# Patient Record
Sex: MALE | Race: OTHER | HISPANIC OR LATINO | Employment: FULL TIME | URBAN - METROPOLITAN AREA
[De-identification: names, ages, dates, MRNs, and addresses within clinical notes are randomized per-mention and may not be internally consistent; named-entity substitution may affect disease eponyms.]

---

## 2017-05-17 ENCOUNTER — ALLSCRIPTS OFFICE VISIT (OUTPATIENT)
Dept: OTHER | Facility: OTHER | Age: 22
End: 2017-05-17

## 2018-01-13 NOTE — MISCELLANEOUS
Provider Comments  Provider Comments:   Patient no showed his new patient 11AM appointment today 5/17/2017      Signatures   Electronically signed by : CHRIS Ocampo; May 17 2017 12:24PM EST                       (Author)

## 2018-10-09 ENCOUNTER — HOSPITAL ENCOUNTER (EMERGENCY)
Facility: HOSPITAL | Age: 23
Discharge: HOME | End: 2018-10-09
Attending: EMERGENCY MEDICINE | Admitting: EMERGENCY MEDICINE

## 2018-10-09 VITALS
HEART RATE: 68 BPM | SYSTOLIC BLOOD PRESSURE: 135 MMHG | DIASTOLIC BLOOD PRESSURE: 63 MMHG | OXYGEN SATURATION: 98 % | TEMPERATURE: 98 F | WEIGHT: 185.2 LBS | RESPIRATION RATE: 16 BRPM

## 2018-10-09 DIAGNOSIS — B30.9 VIRAL CONJUNCTIVITIS OF LEFT EYE: Primary | ICD-10-CM

## 2018-10-09 PROCEDURE — 99283 EMERGENCY DEPT VISIT LOW MDM: CPT

## 2018-10-09 RX ADMIN — FLUORESCEIN SODIUM AND PROPARACAINE HYDROCHLORIDE 1 DROP: 2.5; 5 SOLUTION/ DROPS OPHTHALMIC at 21:28

## 2018-10-10 NOTE — DISCHARGE INSTRUCTIONS

## 2018-10-10 NOTE — ED PROVIDER NOTES
History  Chief Complaint   Patient presents with    Eye Problem     Patient sent home from work and told he cannot return until he is evaluated for possible pink eye  Reports itching and drainage from left eye       23-year-old male with no significant past medical history, presents to emergency department for left eye redness that started 1 day ago  Patient states that eye is itchy  He woke up this morning with crusting to the eye  Denies any copious mucus drainage  Denies blurred vision, double vision, eye pain  Denies flashing lights or loss of vision  Denies fevers or chills, nasal congestion, rhinorrhea, ear pain, headache, dizziness, neck pain or stiffness  Denies any foreign body sensation  Denies use of contacts or glasses  History provided by:  Patient   used: No        None       History reviewed  No pertinent past medical history  History reviewed  No pertinent surgical history  History reviewed  No pertinent family history  I have reviewed and agree with the history as documented  Social History   Substance Use Topics    Smoking status: Never Smoker    Smokeless tobacco: Never Used    Alcohol use No        Review of Systems   Constitutional: Negative for chills and fever  HENT: Negative for congestion, ear pain, postnasal drip, rhinorrhea, sinus pain, sinus pressure, sore throat and trouble swallowing  Eyes: Positive for discharge, redness and itching  Negative for photophobia, pain and visual disturbance  Respiratory: Negative for cough, chest tightness, shortness of breath and wheezing  Cardiovascular: Negative for chest pain, palpitations and leg swelling  Gastrointestinal: Negative for abdominal pain, anal bleeding, constipation, diarrhea, nausea and vomiting  Endocrine: Negative  Genitourinary: Negative for dysuria, flank pain, frequency, hematuria and urgency     Musculoskeletal: Negative for arthralgias, back pain, gait problem, joint swelling, myalgias, neck pain and neck stiffness  Skin: Negative for color change, pallor, rash and wound  Neurological: Negative for dizziness, syncope, weakness, light-headedness, numbness and headaches  Psychiatric/Behavioral: Negative  Physical Exam  Physical Exam   Constitutional: He is oriented to person, place, and time  He appears well-developed and well-nourished  No distress  HENT:   Head: Normocephalic and atraumatic  Mouth/Throat: Oropharynx is clear and moist    Eyes: Pupils are equal, round, and reactive to light  EOM and lids are normal  Right eye exhibits no discharge and no exudate  No foreign body present in the right eye  Left eye exhibits no discharge and no exudate  No foreign body present in the left eye  Right conjunctiva is not injected  Right conjunctiva has no hemorrhage  Left conjunctiva is injected  Left conjunctiva has no hemorrhage  Please see visual acuity section for documentation  No herpetic lesions  No fluorescein uptake  Neck: Normal range of motion  Neck supple  Cardiovascular: Normal rate, regular rhythm and intact distal pulses  Pulmonary/Chest: Effort normal  No respiratory distress  Abdominal: Soft  Musculoskeletal: Normal range of motion  He exhibits no edema or tenderness  Neurological: He is alert and oriented to person, place, and time  Skin: Skin is warm and dry  Capillary refill takes less than 2 seconds  He is not diaphoretic  Psychiatric: He has a normal mood and affect  His behavior is normal    Nursing note and vitals reviewed        Vital Signs  ED Triage Vitals [10/09/18 2049]   Temperature Pulse Respirations Blood Pressure SpO2   98 °F (36 7 °C) 68 16 135/63 98 %      Temp src Heart Rate Source Patient Position - Orthostatic VS BP Location FiO2 (%)   -- Monitor Sitting Right arm --      Pain Score       4           Vitals:    10/09/18 2049   BP: 135/63   Pulse: 68   Patient Position - Orthostatic VS: Sitting Visual Acuity  Visual Acuity      Most Recent Value   Visual acuity R eye is  20/200   Visual acuity Left eye is  20/200   Visual acuity in both eyes is  20/200   Wearing corrective eyewear/lenses? No   No corrective eyewear/lenses  Yes   L Pupil Size (mm)  3   R Pupil Size (mm)  3   L Pupil Shape  Round   R Pupil Shape  Round          ED Medications  Medications   Fluorescein-Proparacaine (FLUCAINE) 0 25-0 5 % ophthalmic solution 1 drop (1 drop Left Eye Given 10/9/18 2128)       Diagnostic Studies  Results Reviewed     None                 No orders to display              Procedures  Procedures       Phone Contacts  ED Phone Contact    ED Course                               MDM  Number of Diagnoses or Management Options  Viral conjunctivitis of left eye:   Diagnosis management comments: Differential Diagnosis includes but is not limited to: Bacterial conjunctivitis, viral conjunctivitis  Low suspicion for iritis, foreign body, abrasions  Patient likely has a viral conjunctivitis as there is no copious discharge  He has been counseled on use of normal saline eyedrops for lubrication  Instructed to wash hands regularly and do not rub eyes  Discharge home with primary care follow-up as needed  CritCare Time    Disposition  Final diagnoses:   Viral conjunctivitis of left eye     Time reflects when diagnosis was documented in both MDM as applicable and the Disposition within this note     Time User Action Codes Description Comment    10/9/2018  9:36 PM Mary Lyons Add [B30 9] Viral conjunctivitis of left eye       ED Disposition     ED Disposition Condition Comment    Discharge  Carito Vazquez discharge to home/self care      Condition at discharge: Good        Follow-up Information     Follow up With Specialties Details Why Contact Info Additional [address] [facility] Medicine In 1 week As needed, If symptoms worsen [address] [facility] [address] [facility] [id number]  [address]  [place] [address], [place], [place], [address]          There are no discharge medications for this patient  No discharge procedures on file      ED Provider  Electronically Signed by           Ara Arredondo PA-C  10/09/18 0579

## 2020-01-27 ENCOUNTER — APPOINTMENT (EMERGENCY)
Dept: RADIOLOGY | Facility: HOSPITAL | Age: 25
End: 2020-01-27

## 2020-01-27 ENCOUNTER — HOSPITAL ENCOUNTER (EMERGENCY)
Facility: HOSPITAL | Age: 25
Discharge: HOME | End: 2020-01-27
Attending: EMERGENCY MEDICINE | Admitting: EMERGENCY MEDICINE

## 2020-01-27 VITALS
WEIGHT: 203.93 LBS | SYSTOLIC BLOOD PRESSURE: 135 MMHG | OXYGEN SATURATION: 98 % | DIASTOLIC BLOOD PRESSURE: 64 MMHG | HEIGHT: 69 IN | TEMPERATURE: 98.7 F | RESPIRATION RATE: 18 BRPM | BODY MASS INDEX: 30.2 KG/M2 | HEART RATE: 80 BPM

## 2020-01-27 DIAGNOSIS — S43.491A OTHER SPRAIN OF RIGHT SHOULDER JOINT, INITIAL ENCOUNTER: Primary | ICD-10-CM

## 2020-01-27 PROCEDURE — 99284 EMERGENCY DEPT VISIT MOD MDM: CPT | Performed by: EMERGENCY MEDICINE

## 2020-01-27 PROCEDURE — 73030 X-RAY EXAM OF SHOULDER: CPT

## 2020-01-27 PROCEDURE — 99283 EMERGENCY DEPT VISIT LOW MDM: CPT

## 2020-01-28 NOTE — ED PROVIDER NOTES
History  Chief Complaint   Patient presents with    Shoulder Injury     Right shoulder injury  Fall     19-year-old male, no past medical history presents for complaints of right shoulder pain  States he was playing football yesterday when he landed on his right shoulder  Has had pain over his right AC joint since then  Denies any numbness tingling or inability to move his arm  Denies previous injury to his shoulder  History provided by:  Patient  Shoulder Injury   Associated symptoms: no fever and no neck pain        None       History reviewed  No pertinent past medical history  History reviewed  No pertinent surgical history  History reviewed  No pertinent family history  I have reviewed and agree with the history as documented  Social History     Tobacco Use    Smoking status: Never Smoker    Smokeless tobacco: Never Used   Substance Use Topics    Alcohol use: No    Drug use: No        Review of Systems   Constitutional: Negative  Negative for chills and fever  HENT: Negative  Negative for rhinorrhea, sore throat, trouble swallowing and voice change  Eyes: Negative  Negative for pain and visual disturbance  Respiratory: Negative  Negative for cough, shortness of breath and wheezing  Cardiovascular: Negative  Negative for chest pain and palpitations  Gastrointestinal: Negative for abdominal pain, diarrhea, nausea and vomiting  Genitourinary: Negative  Negative for dysuria and frequency  Musculoskeletal: Positive for arthralgias  Negative for neck pain and neck stiffness  Skin: Negative  Negative for rash  Neurological: Negative  Negative for dizziness, speech difficulty, weakness, light-headedness and numbness  Physical Exam  Physical Exam   Constitutional: He is oriented to person, place, and time  He appears well-developed and well-nourished  No distress  HENT:   Head: Normocephalic and atraumatic     Mouth/Throat: Oropharynx is clear and moist  Eyes: Pupils are equal, round, and reactive to light  Conjunctivae and EOM are normal    Neck: Normal range of motion  Neck supple  No tracheal deviation present  Cardiovascular: Normal rate, regular rhythm and intact distal pulses  Pulmonary/Chest: Effort normal and breath sounds normal  No respiratory distress  He has no wheezes  He has no rales  Abdominal: Soft  Bowel sounds are normal  He exhibits no distension  There is no tenderness  There is no rebound and no guarding  Musculoskeletal: Normal range of motion  He exhibits no deformity  Right shoulder: He exhibits tenderness, bony tenderness and pain  He exhibits normal range of motion, no swelling, no crepitus, no deformity, no laceration, no spasm, normal pulse and normal strength  Arms:  Neurological: He is alert and oriented to person, place, and time  Skin: Skin is warm and dry  Capillary refill takes less than 2 seconds  No rash noted  Psychiatric: He has a normal mood and affect  His behavior is normal    Nursing note and vitals reviewed  Vital Signs  ED Triage Vitals [01/27/20 2235]   Temperature Pulse Respirations Blood Pressure SpO2   98 7 °F (37 1 °C) 80 18 135/64 98 %      Temp Source Heart Rate Source Patient Position - Orthostatic VS BP Location FiO2 (%)   Tympanic Monitor Sitting Left arm --      Pain Score       9           Vitals:    01/27/20 2235   BP: 135/64   Pulse: 80   Patient Position - Orthostatic VS: Sitting         Visual Acuity      ED Medications  Medications - No data to display    Diagnostic Studies  Results Reviewed     None                 XR shoulder 2+ views RIGHT   ED Interpretation by Bernice Garduno DO (01/27 2301)   No acute fx or dislocation appreciated                   Procedures  Procedures         ED Course                               MDM  Number of Diagnoses or Management Options  Other sprain of right shoulder joint, initial encounter:   Diagnosis management comments: 57-year-old male presented for right shoulder pain after fall approximately 24 hours prior to ER visit  Benign musculoskeletal examination mild tenderness over the right AC joint, otherwise neurovascularly intact on examination  X-ray was negative per my interpretation for acute fracture dislocation  Patient had full range of motion here in the emergency room  He was offered a sling for comfort, he declined this  Advised needs to follow up with primary care doctor and Orthopedics  Amount and/or Complexity of Data Reviewed  Tests in the radiology section of CPT®: ordered and reviewed          Disposition  Final diagnoses:   Other sprain of right shoulder joint, initial encounter     Time reflects when diagnosis was documented in both MDM as applicable and the Disposition within this note     Time User Action Codes Description Comment    1/27/2020 11:09 PM Elizabeth Santana Add [A26 391H] Other sprain of right shoulder joint, initial encounter       ED Disposition     ED Disposition Condition Date/Time Comment    Discharge Stable Mon Jan 27, 2020 11:08 PM Rosalie Counts discharge to home/self care  Follow-up Information     Follow up With Specialties Details Why Contact Info Additional [address] In 1 week  [address] [facility] [address] [facility] [address], [address] [facility] [address]  [address], [address], [address], [address] [place] [address] Specialists [facility]   [address] [facility]  [place] [address] [facility] [address]  [address] [place] [address], [address] [facility] [address] [address] [facility] [address], [address] [place], [place] [address]yariel, [address] [place] [address], [id number]   [phone]          There are no discharge medications for this patient  No discharge procedures on file      ED Provider  Electronically Signed by           Cheryle Lino DO  01/27/20 3287

## 2020-12-09 ENCOUNTER — HOSPITAL ENCOUNTER (EMERGENCY)
Facility: HOSPITAL | Age: 25
Discharge: HOME | End: 2020-12-09
Attending: EMERGENCY MEDICINE
Payer: COMMERCIAL

## 2020-12-09 VITALS
RESPIRATION RATE: 16 BRPM | DIASTOLIC BLOOD PRESSURE: 64 MMHG | OXYGEN SATURATION: 99 % | BODY MASS INDEX: 31.5 KG/M2 | HEART RATE: 68 BPM | TEMPERATURE: 97.6 F | SYSTOLIC BLOOD PRESSURE: 129 MMHG | WEIGHT: 213.31 LBS

## 2020-12-09 DIAGNOSIS — N63.42 SUBAREOLAR MASS OF LEFT BREAST: Primary | ICD-10-CM

## 2020-12-09 PROCEDURE — 99283 EMERGENCY DEPT VISIT LOW MDM: CPT

## 2020-12-09 PROCEDURE — 99282 EMERGENCY DEPT VISIT SF MDM: CPT | Performed by: PHYSICIAN ASSISTANT

## 2021-02-24 ENCOUNTER — TELEPHONE (OUTPATIENT)
Dept: FAMILY MEDICINE CLINIC | Facility: CLINIC | Age: 26
End: 2021-02-24

## 2021-02-25 ENCOUNTER — HOSPITAL ENCOUNTER (EMERGENCY)
Facility: HOSPITAL | Age: 26
Discharge: HOME | End: 2021-02-25
Attending: EMERGENCY MEDICINE | Admitting: EMERGENCY MEDICINE
Payer: COMMERCIAL

## 2021-02-25 VITALS
TEMPERATURE: 98.8 F | DIASTOLIC BLOOD PRESSURE: 56 MMHG | BODY MASS INDEX: 31.48 KG/M2 | WEIGHT: 213.19 LBS | SYSTOLIC BLOOD PRESSURE: 121 MMHG | HEART RATE: 71 BPM | RESPIRATION RATE: 16 BRPM | OXYGEN SATURATION: 96 %

## 2021-02-25 DIAGNOSIS — L02.01 FACIAL ABSCESS: Primary | ICD-10-CM

## 2021-02-25 PROCEDURE — 10160 PNXR ASPIR ABSC HMTMA BULLA: CPT | Performed by: EMERGENCY MEDICINE

## 2021-02-25 PROCEDURE — 99284 EMERGENCY DEPT VISIT MOD MDM: CPT | Performed by: EMERGENCY MEDICINE

## 2021-02-25 PROCEDURE — 99282 EMERGENCY DEPT VISIT SF MDM: CPT

## 2021-02-25 RX ORDER — LIDOCAINE HYDROCHLORIDE AND EPINEPHRINE 10; 10 MG/ML; UG/ML
5 INJECTION, SOLUTION INFILTRATION; PERINEURAL ONCE
Status: COMPLETED | OUTPATIENT
Start: 2021-02-25 | End: 2021-02-25

## 2021-02-25 RX ORDER — NAPROXEN 250 MG/1
500 TABLET ORAL ONCE
Status: COMPLETED | OUTPATIENT
Start: 2021-02-25 | End: 2021-02-25

## 2021-02-25 RX ADMIN — NAPROXEN 500 MG: 250 TABLET ORAL at 20:38

## 2021-02-25 RX ADMIN — LIDOCAINE HYDROCHLORIDE,EPINEPHRINE BITARTRATE 5 ML: 10; .01 INJECTION, SOLUTION INFILTRATION; PERINEURAL at 20:38

## 2021-02-25 NOTE — Clinical Note
Josephntcher Luis was seen and treated in our emergency department on 2021  Diagnosis:     Acqu[place]  may return to work on return date  He may return on this date: 2021         If you have any questions or concerns, please don't hesitate to call        Amelia Santillan RN    ______________________________           _______________          _______________  Hospital Representative                              Date                                Time

## 2021-02-26 NOTE — ED PROVIDER NOTES
History  Chief Complaint   Patient presents with    Abscess     Pt reports abscess to left eye brow for "a few weeks" Pt reports worse today       History provided by:  Patient  Abscess  Location:  Face  Facial abscess location: L eyebrow  Abscess quality: fluctuance, painful and redness    Red streaking: no    Duration: several weeks  Progression:  Worsening  Pain details:     Quality:  Burning    Severity:  Moderate    Duration: several weeks  Timing:  Constant    Progression:  Worsening  Chronicity:  Recurrent  Relieved by:  Nothing  Worsened by:  Nothing  Associated symptoms: no anorexia, no fatigue, no fever, no headaches, no nausea and no vomiting        None       History reviewed  No pertinent past medical history  History reviewed  No pertinent surgical history  History reviewed  No pertinent family history  I have reviewed and agree with the history as documented  E-Cigarette/Vaping    E-Cigarette Use Never User      E-Cigarette/Vaping Substances     Social History     Tobacco Use    Smoking status: Current Some Day Smoker    Smokeless tobacco: Never Used   Substance Use Topics    Alcohol use: Yes     Frequency: 2-4 times a month    Drug use: No       Review of Systems   Constitutional: Negative for activity change, appetite change, fatigue and fever  HENT: Negative for congestion, dental problem, ear pain, rhinorrhea and sore throat  Eyes: Negative for photophobia, pain, discharge, redness, itching and visual disturbance  Respiratory: Negative for chest tightness, shortness of breath and wheezing  Cardiovascular: Negative for chest pain and palpitations  Gastrointestinal: Negative for abdominal pain, anorexia, blood in stool, constipation, diarrhea, nausea and vomiting  Endocrine: Negative for cold intolerance and heat intolerance  Genitourinary: Negative for dysuria, frequency and hematuria  Musculoskeletal: Negative for arthralgias and myalgias     Skin: Positive for rash  Negative for color change and pallor  Neurological: Negative for weakness, numbness and headaches  Hematological: Does not bruise/bleed easily  Psychiatric/Behavioral: Negative for agitation, hallucinations and suicidal ideas  Physical Exam  Physical Exam  Constitutional:       Appearance: He is well-developed  HENT:      Head: Normocephalic and atraumatic  Eyes:      General: Lids are normal       Extraocular Movements: Extraocular movements intact  Conjunctiva/sclera: Conjunctivae normal      Neck:      Musculoskeletal: Normal range of motion  Vascular: No JVD  Trachea: No tracheal deviation  Pulmonary:      Effort: Pulmonary effort is normal  No respiratory distress  Abdominal:      General: There is no distension  Musculoskeletal: Normal range of motion  General: No deformity  Skin:     Coloration: Skin is not pale  Findings: No erythema or rash  Neurological:      Mental Status: He is alert and oriented to person, place, and time     Psychiatric:         Behavior: Behavior normal          Vital Signs  ED Triage Vitals   Temperature Pulse Respirations Blood Pressure SpO2   02/25/21 1851 02/25/21 1851 02/25/21 1851 02/25/21 1851 02/25/21 1851   98 8 °F (37 1 °C) 71 16 121/56 96 %      Temp Source Heart Rate Source Patient Position - Orthostatic VS BP Location FiO2 (%)   02/25/21 1851 02/25/21 1851 02/25/21 1851 02/25/21 1851 --   Oral Monitor Sitting Right arm       Pain Score       02/25/21 2038       5           Vitals:    02/25/21 1851   BP: 121/56   Pulse: 71   Patient Position - Orthostatic VS: Sitting         Visual Acuity      ED Medications  Medications   lidocaine-epinephrine (XYLOCAINE/EPINEPHRINE) 1 %-1:100,000 injection 5 mL (5 mL Infiltration Given by Other 2/25/21 2038)   naproxen (NAPROSYN) tablet 500 mg (500 mg Oral Given 2/25/21 2038)       Diagnostic Studies  Results Reviewed     None                 No orders to display Procedures  Incision and drain    Date/Time: 2/25/2021 8:12 PM  Performed by: Genoveva Watson MD  Authorized by: Genoveva Watson MD   Universal Protocol:  Consent: Verbal consent obtained  Risks and benefits: risks, benefits and alternatives were discussed  Consent given by: patient  Time out: Immediately prior to procedure a "time out" was called to verify the correct patient, procedure, equipment, support staff and site/side marked as required  Timeout called at: 2/25/2021 8:12 PM   Patient understanding: patient states understanding of the procedure being performed  Patient consent: the patient's understanding of the procedure matches consent given  Procedure consent: procedure consent matches procedure scheduled  Relevant documents: relevant documents present and verified  Test results: test results available and properly labeled  Site marked: the operative site was marked  Required items: required blood products, implants, devices, and special equipment available  Patient identity confirmed: verbally with patient and arm band      Patient location:  ED  Location:     Type:  Abscess    Size:  2    Location:  Head/neck    Head/neck location: L eyebrow  Pre-procedure details:     Skin preparation:  Antiseptic wash  Anesthesia (see MAR for exact dosages): Anesthesia method:  Local infiltration    Local anesthetic:  Lidocaine 1% WITH epi  Procedure details:     Complexity:  Simple    Needle aspiration: yes      Needle size:  18 G    Aspiration type: puncture aspiration      Approach:  Puncture    Incision depth:  Submucosal    Wound management:  Irrigated with saline    Drainage:  Bloody and purulent    Drainage amount: Moderate    Wound treatment:  Wound left open    Packing materials:  None  Post-procedure details:     Patient tolerance of procedure:   Tolerated well, no immediate complications             ED Course                                           MDM  Number of Diagnoses or Management Options  Facial abscess:   Diagnosis management comments: l eyebrow laceration-will do I&D, f/u      Disposition  Final diagnoses:   Facial abscess     Time reflects when diagnosis was documented in both MDM as applicable and the Disposition within this note     Time User Action Codes Description Comment    2/25/2021  8:07 PM Rondi Juan J Add [R95 488] Stye     2/25/2021  8:15 PM Rondi Juan J Remove [R12 [id number]] Stye     2/25/2021  8:15 PM Rondi Juan J Add [L02 01] Facial abscess       ED Disposition     ED Disposition Condition Date/Time Comment    Discharge Stable u Feb 25, 2021  8:07 PM Verónica Ortiz discharge to home/self care  Follow-up Information     Follow up With Specialties Details Why Contact Info Additional [address] [facility] Medicine Go in 2 days For wound re-check [address], [address] [place] [id number]  [address], [address], [address] [place], [place], [address]          Patient's Medications    No medications on file     No discharge procedures on file      PDMP Review     None          ED Provider  Electronically Signed by           Quin Morgan MD  02/25/21 2042

## 2021-03-19 ENCOUNTER — OFFICE VISIT (OUTPATIENT)
Dept: FAMILY MEDICINE CLINIC | Facility: CLINIC | Age: 26
End: 2021-03-19

## 2021-03-19 VITALS
HEIGHT: 69 IN | DIASTOLIC BLOOD PRESSURE: 88 MMHG | WEIGHT: 212.3 LBS | BODY MASS INDEX: 31.44 KG/M2 | OXYGEN SATURATION: 97 % | TEMPERATURE: 97.3 F | SYSTOLIC BLOOD PRESSURE: 114 MMHG | RESPIRATION RATE: 18 BRPM | HEART RATE: 64 BPM

## 2021-03-19 DIAGNOSIS — N50.819 TESTICLE TENDERNESS: ICD-10-CM

## 2021-03-19 DIAGNOSIS — Z76.89 ENCOUNTER TO ESTABLISH CARE: ICD-10-CM

## 2021-03-19 DIAGNOSIS — N64.4 BREAST TENDERNESS IN MALE: Primary | ICD-10-CM

## 2021-03-19 PROCEDURE — 99203 OFFICE O/P NEW LOW 30 MIN: CPT | Performed by: FAMILY MEDICINE

## 2021-03-19 PROCEDURE — 3725F SCREEN DEPRESSION PERFORMED: CPT | Performed by: FAMILY MEDICINE

## 2021-03-19 PROCEDURE — 3008F BODY MASS INDEX DOCD: CPT | Performed by: FAMILY MEDICINE

## 2021-03-19 NOTE — PATIENT INSTRUCTIONS
Lifestyle Medicine Tip Sheet    1  Eat predominantly less processed foods such as fast food, T V  dinners, and georges  2  Eat Close to [employer], [employer] or [employer]    3  Eat a predominantly plant based diet   a  Dark Leafy Greens  b  Fruits/Vegetables  c  Whole Grains: Whole wheat, barely, wheat berries, quinoa, steel cut oats, brown rice, whole wheat pasta  d  Legumes: kidney beans, saul beans, white beans, black beans, garbanzo beans (chickpeas), lima beans (mature, dried), split peas, lentils, and edamame (green soybeans)      4  At least half of the plate should contain fruits or vegetables        5  Liquid should be predominantly water  (limit soda and juice)    6  Watch portion size  7  Foods you should avoid or limit?  - Fats - Specifically saturated and trans-fats  They are found in margarines, many fast foods, and some store-bought baked goods  Saturated and Trans-fats can raise your cholesterol level and your chance of getting heart disease    - When you cook, it's best to use no oils but if needed try to limit the amount of oil used as oil contains many calories per volume and is very unhealthy when heated during cooking    - Sugar -Limit or avoid sugar, sweets, and refined grains  Refined grains are found in white bread, white rice, most forms of pasta, and most packaged "snack" foods    - Try not to cook with salt and avoid  adding extra salt to your  meals   - Meat - Studies have shown that eating a lot of red meat and poultry can increase your risk of certain health problems, including heart disease, diabetes, obesity and cancer  So try to limit the intake of it  8  Practice good sleep hygiene by getting 7-9 hours of sleep a night    9  Daily exercise minimum of 30 minutes (walking around the block)    10  Socialization (friends and family)   - Explore your neighborhood   Go to the park, spend time at [employer]  - Consider taking a class or volunteering to connect with new people    If you are interested you can read more about healthy food choices at the following websites:  a  NutritionZwittle  org  b  Home cooking recipes: https://www Medypal/  c  http://margie info/  d  Familydoctor  org

## 2021-03-19 NOTE — ASSESSMENT & PLAN NOTE
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

## 2021-03-19 NOTE — ASSESSMENT & PLAN NOTE
Noticed that his left breast has been tender since past Novemeber/ December,  Along with what appeared to be a mass   denies any discharged area   denies any trauma to the breast   reported tenderness is with palpation or when his  Kids sit on his chest   physical exam is positive for tenderness on the left breast, there is no discharge noted     - will order ultrasound of the left breast

## 2021-03-19 NOTE — PROGRESS NOTES
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  Denies any swelling or tenderness at rest,  Denies fever chills   examination in the office is negative for any swelling, it was not tender  A chaperone was present during the examination   cough exam is negative for any presence of hernia    - will order ultrasound of the scrotum, testes and pelvis to rule out hernia    Encounter to establish care   New patient to the office here  To  Establish care   will order HIV and hep C screening       Diagnoses and all orders for this visit:    Breast tenderness in male  -     US breast left limited (diagnostic); Future    Testicle tenderness  -     US scrotum and testicles; Future  -     US groin/inguinal area; Future    Encounter to establish care  -     HIV 1/2 Antigen/Antibody (4th Generation) w Reflex [facility]; Future  -     Hepatitis C antibody; Future          Subjective:      Patient ID: Reuben Richards is a 31 y o  male  HPI   Reuben Richards is a 31 y o  male  Previously healthy male who presented office to discuss a mass that he felt in his breast along with his testicle pain  -  Left breast mass/pain:  Started since November of last year, denies any trauma to the area  It is tender on palpation and when his gait sits on it    Denies discharged  -  Scrotum pain:  Present since September around the same time that he started a new job that involves heavy lifting  Stated that the scrotum feel painful after exercising for about an hour so   denies fever or chill or other symptoms associated with    The following portions of the patient's history were reviewed and updated as appropriate: allergies, current medications, past family history, past medical history, past social history, past surgical history and problem list     Review of Systems   Constitutional: Negative for activity change, appetite change, chills, diaphoresis, fatigue and fever  Respiratory: Negative for apnea, cough, choking, chest tightness, shortness of breath, wheezing and stridor  Cardiovascular: Negative for chest pain, palpitations and leg swelling  Gastrointestinal: Negative for abdominal distention and abdominal pain  Genitourinary: Positive for testicular pain  Negative for discharge, genital sores, penile pain, penile swelling and scrotal swelling  Left breast tenderness          Objective:      /88 (BP Location: Left arm, Patient Position: Sitting, Cuff Size: Standard)   Pulse 64   Temp (!) 97 3 °F (36 3 °C) (Temporal)   Resp 18   Ht 5' 9" (1 753 m)   Wt 96 3 kg (212 lb 4 8 oz)   SpO2 97%   BMI 31 35 kg/m²          Physical Exam  Exam conducted with a chaperone present  Constitutional:       Appearance: He is well-developed  HENT:      Head: Normocephalic and atraumatic  Neck:      Musculoskeletal: Neck supple  Cardiovascular:      Rate and Rhythm: Normal rate  Heart sounds: Normal heart sounds  No murmur  No friction rub  No gallop  Pulmonary:      Effort: Pulmonary effort is normal  No respiratory distress  Breath sounds: Normal breath sounds  Chest:       Abdominal:      General: Bowel sounds are normal       Palpations: Abdomen is soft  Hernia: There is no hernia in the left inguinal area or right inguinal area  Genitourinary:     Pubic Area: No rash or pubic lice         Penis: Normal  No phimosis, paraphimosis, hypospadias, erythema, tenderness, discharge, swelling or lesions  Scrotum/Testes: Normal          Right: Mass, tenderness, swelling, testicular hydrocele or varicocele not present  Right testis is descended  Left: Mass, tenderness, swelling, testicular hydrocele or varicocele not present  Left testis is descended  Musculoskeletal: Normal range of motion  Neurological:      Mental Status: He is alert and oriented to person, place, and time

## 2021-11-08 ENCOUNTER — OFFICE VISIT (OUTPATIENT)
Dept: FAMILY MEDICINE CLINIC | Facility: CLINIC | Age: 26
End: 2021-11-08

## 2021-11-08 DIAGNOSIS — Z20.822 SUSPECTED COVID-19 VIRUS INFECTION: Primary | ICD-10-CM

## 2021-11-08 PROCEDURE — U0005 INFEC AGEN DETEC AMPLI PROBE: HCPCS | Performed by: FAMILY MEDICINE

## 2021-11-08 PROCEDURE — U0003 INFECTIOUS AGENT DETECTION BY NUCLEIC ACID (DNA OR RNA); SEVERE ACUTE RESPIRATORY SYNDROME CORONAVIRUS 2 (SARS-COV-2) (CORONAVIRUS DISEASE [COVID-19]), AMPLIFIED PROBE TECHNIQUE, MAKING USE OF HIGH THROUGHPUT TECHNOLOGIES AS DESCRIBED BY CMS-2020-01-R: HCPCS | Performed by: FAMILY MEDICINE

## 2021-11-08 PROCEDURE — G2012 BRIEF CHECK IN BY MD/QHP: HCPCS | Performed by: FAMILY MEDICINE

## 2021-12-14 ENCOUNTER — OFFICE VISIT (OUTPATIENT)
Dept: FAMILY MEDICINE CLINIC | Facility: CLINIC | Age: 26
End: 2021-12-14

## 2021-12-14 DIAGNOSIS — Z20.822 SUSPECTED COVID-19 VIRUS INFECTION: Primary | ICD-10-CM

## 2021-12-14 PROCEDURE — G0071 COMM SVCS BY RHC/FQHC 5 MIN: HCPCS | Performed by: NURSE PRACTITIONER

## 2021-12-14 PROCEDURE — U0005 INFEC AGEN DETEC AMPLI PROBE: HCPCS | Performed by: NURSE PRACTITIONER

## 2021-12-14 PROCEDURE — U0003 INFECTIOUS AGENT DETECTION BY NUCLEIC ACID (DNA OR RNA); SEVERE ACUTE RESPIRATORY SYNDROME CORONAVIRUS 2 (SARS-COV-2) (CORONAVIRUS DISEASE [COVID-19]), AMPLIFIED PROBE TECHNIQUE, MAKING USE OF HIGH THROUGHPUT TECHNOLOGIES AS DESCRIBED BY CMS-2020-01-R: HCPCS | Performed by: NURSE PRACTITIONER

## 2023-01-24 ENCOUNTER — OFFICE VISIT (OUTPATIENT)
Dept: FAMILY MEDICINE CLINIC | Facility: CLINIC | Age: 28
End: 2023-01-24

## 2023-01-24 VITALS
DIASTOLIC BLOOD PRESSURE: 72 MMHG | OXYGEN SATURATION: 99 % | SYSTOLIC BLOOD PRESSURE: 124 MMHG | TEMPERATURE: 98 F | RESPIRATION RATE: 16 BRPM | HEIGHT: 70 IN | WEIGHT: 178 LBS | HEART RATE: 81 BPM | BODY MASS INDEX: 25.48 KG/M2

## 2023-01-24 DIAGNOSIS — R68.89 FLU-LIKE SYMPTOMS: Primary | ICD-10-CM

## 2023-01-24 LAB
SARS-COV-2 AG UPPER RESP QL IA: POSITIVE
VALID CONTROL: ABNORMAL

## 2023-01-24 RX ORDER — BROMPHENIRAMINE MALEATE, PSEUDOEPHEDRINE HYDROCHLORIDE, AND DEXTROMETHORPHAN HYDROBROMIDE 2; 30; 10 MG/5ML; MG/5ML; MG/5ML
2.5 SYRUP ORAL 4 TIMES DAILY PRN
Qty: 120 ML | Refills: 0 | Status: SHIPPED | OUTPATIENT
Start: 2023-01-24

## 2023-01-24 NOTE — LETTER
January 24, 2023     Patient: Patrick Salcedo  YOB: 1995  Date of Visit: 1/24/2023      To Whom it May Concern:    Patrick Salcedo is under my professional care  Love Kumark was seen in my office on 1/24/2023  He will need to isolate pending result of COVID test      If you have any questions or concerns, please don't hesitate to call           Sincerely,          [employer] [facility]        CC: No Recipients

## 2023-01-24 NOTE — LETTER
January 24, 2023     Patient: Barry Benton  YOB: 1995  Date of Visit: 1/24/2023      To Whom it May Concern:    Barry Benton is under my professional care  Bhavna Tee was seen in my office on 1/24/2023  He will need isolation precautions for 5 days from onset of symptoms  He can return to work on 1/30/2023  If you have any questions or concerns, please don't hesitate to call           Sincerely,          [place] [facility]        CC: No Recipients

## 2023-01-24 NOTE — PROGRESS NOTES
Name: Jaky Andrade      : 1995      MRN: [id number]  Encounter Provider: Jarad Satnoyo Mifflin  Encounter Date: 2023   Encounter department: [facility] [address]  Flu-like symptoms  Assessment & Plan:  -A few day onset of viral URI symptoms  -Rapid COVID test positive  -Recommend supportive care  -Recommend 5 days of isolation precautions    Orders:  -     menthol-cetylpyridinium (CEPACOL) 3 MG lozenge; Take 1 lozenge (3 mg total) by mouth as needed for sore throat  -     brompheniramine-pseudoephedrine-DM 30-2-10 MG/5ML syrup; Take 2 5 mL by mouth 4 (four) times a day as needed for congestion or cough  -     POCT Rapid Covid Ag      Depression Screening and Follow-up Plan: Patient was screened for depression during today's encounter  They screened negative with a PHQ-2 score of 0  Subjective      -50-year-old male with no significant past medical history who presents today with few day onset of flulike symptoms  -Patient reports (subjective fever), dry mouth, sore throat, cough with mucous, fatigue, body ache, and reduced appetite since 2024  -He denies any recent travel   -He reports that his youngest son was the first to get sick      Review of Systems   Constitutional: Positive for chills and fever  Negative for appetite change, diaphoresis and fatigue  HENT: Positive for congestion, postnasal drip, rhinorrhea and sore throat  Negative for trouble swallowing  Respiratory: Positive for cough  Negative for shortness of breath and wheezing  Cardiovascular: Negative for chest pain, palpitations and leg swelling  Gastrointestinal: Negative for abdominal distention, abdominal pain, blood in stool, constipation, diarrhea, nausea and vomiting  Musculoskeletal: Positive for myalgias  Negative for back pain  Skin: Negative for rash  Neurological: Negative for tremors and seizures     Hematological: Negative for adenopathy  No current outpatient medications on file prior to visit  Objective     /72 (BP Location: Right arm, Patient Position: Sitting, Cuff Size: Standard)   Pulse 81   Temp 98 °F (36 7 °C)   Resp 16   Ht 5' 10" (1 778 m)   Wt 80 7 kg (178 lb)   SpO2 99%   BMI 25 54 kg/m²     Physical Exam  Constitutional:       General: He is not in acute distress  Appearance: Normal appearance  He is not ill-appearing, toxic-appearing or diaphoretic  HENT:      Head: Normocephalic and atraumatic  Right Ear: There is no impacted cerumen  Left Ear: There is no impacted cerumen  Nose: Rhinorrhea present  Mouth/Throat:      Pharynx: Posterior oropharyngeal erythema present  No oropharyngeal exudate  Eyes:      General: No scleral icterus  Right eye: No discharge  Left eye: No discharge  Extraocular Movements: Extraocular movements intact  Cardiovascular:      Rate and Rhythm: Normal rate and regular rhythm  Heart sounds: No murmur heard  No friction rub  No gallop  Pulmonary:      Effort: No respiratory distress  Breath sounds: Normal breath sounds  No stridor  No wheezing or rhonchi  Abdominal:      General: There is no distension  Palpations: Abdomen is soft  There is no mass  Tenderness: There is no abdominal tenderness  There is no guarding or rebound  Hernia: No hernia is present  Musculoskeletal:         General: Normal range of motion  Cervical back: Normal range of motion  Skin:     Capillary Refill: Capillary refill takes less than 2 seconds  Findings: No lesion or rash  Neurological:      General: No focal deficit present  Mental Status: He is alert and oriented to person, place, and time  Cranial Nerves: No cranial nerve deficit  Sensory: No sensory deficit         [address] [place] [address]

## 2023-01-24 NOTE — ASSESSMENT & PLAN NOTE
-A few day onset of viral URI symptoms  -Rapid COVID test positive  -Recommend supportive care  -Recommend 5 days of isolation precautions

## 2023-06-14 ENCOUNTER — TELEPHONE (OUTPATIENT)
Dept: FAMILY MEDICINE CLINIC | Facility: CLINIC | Age: 28
End: 2023-06-14

## 2023-06-14 ENCOUNTER — LAB (OUTPATIENT)
Dept: LAB | Facility: HOSPITAL | Age: 28
End: 2023-06-14
Payer: COMMERCIAL

## 2023-06-14 ENCOUNTER — OFFICE VISIT (OUTPATIENT)
Dept: FAMILY MEDICINE CLINIC | Facility: CLINIC | Age: 28
End: 2023-06-14

## 2023-06-14 VITALS
BODY MASS INDEX: 24.77 KG/M2 | HEART RATE: 65 BPM | HEIGHT: 70 IN | WEIGHT: 173 LBS | SYSTOLIC BLOOD PRESSURE: 122 MMHG | RESPIRATION RATE: 18 BRPM | DIASTOLIC BLOOD PRESSURE: 80 MMHG | OXYGEN SATURATION: 97 % | TEMPERATURE: 97.5 F

## 2023-06-14 DIAGNOSIS — J02.9 PHARYNGITIS, UNSPECIFIED ETIOLOGY: ICD-10-CM

## 2023-06-14 DIAGNOSIS — R30.9 PAIN WITH URINATION: Primary | ICD-10-CM

## 2023-06-14 DIAGNOSIS — R30.9 PAIN WITH URINATION: ICD-10-CM

## 2023-06-14 LAB
SL AMB  POCT GLUCOSE, UA: NORMAL
SL AMB LEUKOCYTE ESTERASE,UA: NORMAL
SL AMB POCT BILIRUBIN,UA: NORMAL
SL AMB POCT BLOOD,UA: NORMAL
SL AMB POCT CLARITY,UA: CLEAR
SL AMB POCT COLOR,UA: YELLOW
SL AMB POCT KETONES,UA: NORMAL
SL AMB POCT NITRITE,UA: NORMAL
SL AMB POCT PH,UA: 8
SL AMB POCT SPECIFIC GRAVITY,UA: NORMAL
SL AMB POCT URINE PROTEIN: NORMAL
SL AMB POCT UROBILINOGEN: NORMAL

## 2023-06-14 PROCEDURE — 87491 CHLMYD TRACH DNA AMP PROBE: CPT

## 2023-06-14 PROCEDURE — 87591 N.GONORRHOEAE DNA AMP PROB: CPT

## 2023-06-14 PROCEDURE — 87147 CULTURE TYPE IMMUNOLOGIC: CPT

## 2023-06-14 PROCEDURE — 81002 URINALYSIS NONAUTO W/O SCOPE: CPT

## 2023-06-14 PROCEDURE — 87070 CULTURE OTHR SPECIMN AEROBIC: CPT

## 2023-06-14 PROCEDURE — 99214 OFFICE O/P EST MOD 30 MIN: CPT

## 2023-06-14 PROCEDURE — 36415 COLL VENOUS BLD VENIPUNCTURE: CPT

## 2023-06-14 PROCEDURE — 87661 TRICHOMONAS VAGINALIS AMPLIF: CPT

## 2023-06-14 PROCEDURE — 83036 HEMOGLOBIN GLYCOSYLATED A1C: CPT

## 2023-06-14 RX ORDER — PREDNISONE 20 MG/1
20 TABLET ORAL DAILY
Qty: 4 TABLET | Refills: 0 | Status: SHIPPED | OUTPATIENT
Start: 2023-06-14 | End: 2023-06-18

## 2023-06-14 RX ORDER — PHENAZOPYRIDINE HYDROCHLORIDE 100 MG/1
100 TABLET, FILM COATED ORAL 3 TIMES DAILY PRN
Qty: 10 TABLET | Refills: 0 | Status: SHIPPED | OUTPATIENT
Start: 2023-06-14

## 2023-06-14 NOTE — ASSESSMENT & PLAN NOTE
POCT urine - neg for blood, leuks, nitrite, glucose   Start pyridium for bladder spasm   STD screening - future     Make follow up appointment if symptoms worsen or do not improve; encouraged to establish with a pcp

## 2023-06-14 NOTE — PROGRESS NOTES
Name: Garrison Levine      : 1995      MRN: [id number]  Encounter Provider: Jarad Naqvi  Encounter Date: 2023   Encounter department: [facility]     1  Pain with urination  Assessment & Plan:  POCT urine - neg for blood, leuks, nitrite, glucose   Start pyridium for bladder spasm   STD screening - future  Make follow up appointment if symptoms worsen or do not improve; encouraged to establish with a pcp    Orders:  -     POCT urine dip  -     Chlamydia/N  gonorrhoeae RNA, TMA; Future  -     Trichomonas vaginalis RNA Qual TMA, Males; Future  -     Hemoglobin A1C; Future  -     phenazopyridine (PYRIDIUM) 100 mg tablet; Take 1 tablet (100 mg total) by mouth 3 (three) times a day as needed for bladder spasms    2  Pharyngitis, unspecified etiology  Assessment & Plan:  +erythema no tonsillar exudate, no fever chills   Throat culture - future   Start prednisone 20 mg x4 days for symptoms relief   Discussed taking OTC ibuprofen and warm salt water gargles for symptoms relief  Orders:  -     Throat culture  -     predniSONE 20 mg tablet; Take 1 tablet (20 mg total) by mouth daily for 4 days         Subjective      Garrison  28 y o  male  has no past medical history on file  Presenting today for same day visit for evaluation of sore throat and pain with urination  Both symptoms started around the same time a few days ago  Denies fatigue, headaches, dizziness, blurred vision, nausea, palpitation, chest pain, SOB, urinary changes, weakness, bowel changes, sleep problems,  sick contacts, red flag signs,  or recent travel   Overall patient reports feeling well   Patient has no further complaints other than what is mentioned in the ROS  Sore Throat   This is a new problem  The current episode started in the past 7 days  The problem has been waxing and waning  There has been no fever   The fever has been present for less than 1 day  The pain is at a severity of 8/10  The pain is moderate  Associated symptoms include a hoarse voice and trouble swallowing  Pertinent negatives include no abdominal pain, coughing, ear discharge, ear pain, neck pain, shortness of breath or vomiting  He has tried nothing for the symptoms  The treatment provided no relief  Urinary Frequency   This is a new problem  The current episode started in the past 7 days  The problem occurs intermittently (pain with urination)  The problem has been waxing and waning  The quality of the pain is described as aching  The pain is at a severity of 0/10  The patient is experiencing no pain  There has been no fever  Associated symptoms include chills  Pertinent negatives include no discharge, flank pain, frequency, hematuria, hesitancy, nausea, urgency or vomiting  He has tried nothing for the symptoms  The treatment provided no relief  Review of Systems   Constitutional: Positive for chills  Negative for fever  HENT: Positive for hoarse voice and trouble swallowing  Negative for ear discharge, ear pain and sore throat  Eyes: Negative for pain and visual disturbance  Respiratory: Negative for cough and shortness of breath  Cardiovascular: Negative for chest pain and palpitations  Gastrointestinal: Negative for abdominal pain, nausea and vomiting  Genitourinary: Negative for dysuria, flank pain, frequency, hematuria, hesitancy and urgency  Pain with urination   Musculoskeletal: Negative for arthralgias, back pain and neck pain  Skin: Negative for color change and rash  Neurological: Negative for seizures and syncope  All other systems reviewed and are negative        Current Outpatient Medications on File Prior to Visit   Medication Sig   • brompheniramine-pseudoephedrine-DM 30-2-10 MG/5ML syrup Take 2 5 mL by mouth 4 (four) times a day as needed for congestion or cough   • menthol-cetylpyridinium (CEPACOL) 3 MG lozenge Take "1 lozenge (3 mg total) by mouth as needed for sore throat       Objective     /80 (BP Location: Left arm, Patient Position: Sitting, Cuff Size: Standard)   Pulse 65   Temp 97 5 °F (36 4 °C) (Temporal)   Resp 18   Ht 5' 10\" (1 778 m)   Wt 78 5 kg (173 lb)   SpO2 97%   BMI 24 82 kg/m²     Physical Exam  Vitals and nursing note reviewed  Constitutional:       General: He is not in acute distress  Appearance: Normal appearance  He is not ill-appearing  HENT:      Head: Normocephalic and atraumatic  Right Ear: Tympanic membrane, ear canal and external ear normal       Left Ear: Tympanic membrane, ear canal and external ear normal       Nose: Nose normal       Mouth/Throat:      Mouth: Mucous membranes are moist       Pharynx: Posterior oropharyngeal erythema present  No pharyngeal swelling  Tonsils: No tonsillar exudate  Eyes:      General:         Right eye: No discharge  Left eye: No discharge  Pupils: Pupils are equal, round, and reactive to light  Cardiovascular:      Rate and Rhythm: Normal rate and regular rhythm  Pulses: Normal pulses  Heart sounds: Normal heart sounds  Pulmonary:      Effort: Pulmonary effort is normal  No respiratory distress  Breath sounds: Normal breath sounds  No wheezing  Abdominal:      General: Bowel sounds are normal       Palpations: Abdomen is soft  Tenderness: There is no abdominal tenderness  There is no right CVA tenderness or left CVA tenderness  Musculoskeletal:         General: Normal range of motion  Cervical back: Normal range of motion  Skin:     General: Skin is warm and dry  Neurological:      General: No focal deficit present  Mental Status: He is alert and oriented to person, place, and time         [address] [place] [address]"

## 2023-06-14 NOTE — TELEPHONE ENCOUNTER
Kaya Sanes tardes  Zoila le habla Raynamodesto Trujillo es para sacar johnna para mi esposo, Tono Lindquist fecha nacimiento, [birth year]ubre [birth year] del [birth year]  Mi número de teléfono es el [phone] isela          Pt scheduled

## 2023-06-14 NOTE — ASSESSMENT & PLAN NOTE
+erythema no tonsillar exudate, no fever chills   Throat culture - future   Start prednisone 20 mg x4 days for symptoms relief   Discussed taking OTC ibuprofen and warm salt water gargles for symptoms relief

## 2023-06-15 LAB
C TRACH DNA SPEC QL NAA+PROBE: NEGATIVE
EST. AVERAGE GLUCOSE BLD GHB EST-MCNC: 105 MG/DL
HBA1C MFR BLD: 5.3 %
N GONORRHOEA DNA SPEC QL NAA+PROBE: NEGATIVE

## 2023-06-16 ENCOUNTER — TELEPHONE (OUTPATIENT)
Dept: FAMILY MEDICINE CLINIC | Facility: CLINIC | Age: 28
End: 2023-06-16

## 2023-06-16 ENCOUNTER — LAB (OUTPATIENT)
Dept: LAB | Facility: HOSPITAL | Age: 28
End: 2023-06-16
Payer: COMMERCIAL

## 2023-06-16 DIAGNOSIS — R30.9 PAIN WITH URINATION: ICD-10-CM

## 2023-06-16 DIAGNOSIS — R30.9 PAIN WITH URINATION: Primary | ICD-10-CM

## 2023-06-16 LAB
BILIRUB UR QL STRIP: NEGATIVE
CLARITY UR: CLEAR
COLOR UR: NORMAL
GLUCOSE UR STRIP-MCNC: NEGATIVE MG/DL
HGB UR QL STRIP.AUTO: NEGATIVE
KETONES UR STRIP-MCNC: NEGATIVE MG/DL
LEUKOCYTE ESTERASE UR QL STRIP: NEGATIVE
NITRITE UR QL STRIP: NEGATIVE
PH UR STRIP.AUTO: 7 [PH]
PROT UR STRIP-MCNC: NEGATIVE MG/DL
SP GR UR STRIP.AUTO: 1 (ref 1–1.04)
T VAGINALIS RRNA SPEC QL NAA+PROBE: NEGATIVE
UROBILINOGEN UA: NEGATIVE MG/DL

## 2023-06-16 PROCEDURE — 81003 URINALYSIS AUTO W/O SCOPE: CPT

## 2023-06-16 NOTE — TELEPHONE ENCOUNTER
Pt would like to get more test and be retested for GC/chlamydia because he feels that something is wrong still so he doesn't think the test is accurate so he wants blood work, I also let him know that we are still waiting for the other results to come

## 2023-06-16 NOTE — RESULT ENCOUNTER NOTE
Trichomonas , gc/chlamydia - neg   Placed order for repeat UA may obtain in lab, also placed order for urologist due to continued symptoms   Encourage patient to establish with a pcp as I saw him for a same day visit

## 2023-06-17 DIAGNOSIS — J02.9 PHARYNGITIS, UNSPECIFIED ETIOLOGY: Primary | ICD-10-CM

## 2023-06-17 LAB — BACTERIA THROAT CULT: ABNORMAL

## 2023-06-17 RX ORDER — AMOXICILLIN 500 MG/1
500 TABLET, FILM COATED ORAL 2 TIMES DAILY
Qty: 20 TABLET | Refills: 0 | Status: SHIPPED | OUTPATIENT
Start: 2023-06-17 | End: 2023-06-27

## 2023-06-18 NOTE — RESULT ENCOUNTER NOTE
UA - neg   Throat culture positive for strep   Will send antibiotic - amoxicillin bid for 10 days, please change toothbrush 24 hr after starting antibiotic to prevent re-infection

## 2023-06-19 DIAGNOSIS — R30.9 PAIN WITH URINATION: Primary | ICD-10-CM

## 2023-06-19 NOTE — TELEPHONE ENCOUNTER
He would like to know if him having strep can cause his burning with urination,he would also like to give another urine for the GC/chlamydia because he stated she gave multiple urine in under 2 hours and he feels that it might not be accurate, he stated he does feel better the burning has went away but he would still like to be tested again

## 2023-07-06 ENCOUNTER — TELEPHONE (OUTPATIENT)
Dept: UROLOGY | Facility: AMBULATORY SURGERY CENTER | Age: 28
End: 2023-07-06

## 2023-07-06 NOTE — TELEPHONE ENCOUNTER
New Patient    What is the reason for the patient’s appointment?: referral R30.9 (ICD-10-CM) - Pain with urination    [facility] made appt. Pt has had the pain when urinating started a few days ago aching pain which only happens once in a while and not extreme, no discharge or flank pain, no hematuria.      What office location does the patient prefer?: [place]    Does patient have Imaging/Lab Results:No    Have patient records been requested?: no  If No, are the records showing in Epic: epic      INSURANCE:   Do we accept the patient's insurance or is the patient Self-Pay?: yes    Insurance Provider: [employer]  Plan Type/Number:   Member ID#:       HISTORY:   Has the patient had any previous Urologist(s)?: no    Was the patient seen in the ED?: no    Has the patient had any outside testing done?: no    Does the patient have a personal history of cancer?: no

## 2023-07-19 ENCOUNTER — HOSPITAL ENCOUNTER (EMERGENCY)
Facility: HOSPITAL | Age: 28
Discharge: HOME | End: 2023-07-19
Attending: EMERGENCY MEDICINE
Payer: COMMERCIAL

## 2023-07-19 VITALS
RESPIRATION RATE: 18 BRPM | WEIGHT: 171.4 LBS | HEART RATE: 63 BPM | TEMPERATURE: 97.3 F | BODY MASS INDEX: 24.59 KG/M2 | OXYGEN SATURATION: 100 % | SYSTOLIC BLOOD PRESSURE: 124 MMHG | DIASTOLIC BLOOD PRESSURE: 64 MMHG

## 2023-07-19 DIAGNOSIS — R30.0 DYSURIA: Primary | ICD-10-CM

## 2023-07-19 DIAGNOSIS — Z76.89 ENCOUNTER FOR ASSESSMENT OF STD EXPOSURE: ICD-10-CM

## 2023-07-19 LAB
BILIRUB UR QL STRIP: NEGATIVE
CLARITY UR: CLEAR
COLOR UR: NORMAL
GLUCOSE UR STRIP-MCNC: NEGATIVE MG/DL
HGB UR QL STRIP.AUTO: NEGATIVE
KETONES UR STRIP-MCNC: NEGATIVE MG/DL
LEUKOCYTE ESTERASE UR QL STRIP: NEGATIVE
NITRITE UR QL STRIP: NEGATIVE
PH UR STRIP.AUTO: 7 [PH]
PROT UR STRIP-MCNC: NEGATIVE MG/DL
S PYO DNA THROAT QL NAA+PROBE: NOT DETECTED
SP GR UR STRIP.AUTO: 1 (ref 1–1.04)
UROBILINOGEN UA: NEGATIVE MG/DL

## 2023-07-19 PROCEDURE — 81003 URINALYSIS AUTO W/O SCOPE: CPT | Performed by: PHYSICIAN ASSISTANT

## 2023-07-19 PROCEDURE — 87651 STREP A DNA AMP PROBE: CPT | Performed by: PHYSICIAN ASSISTANT

## 2023-07-19 PROCEDURE — 87070 CULTURE OTHR SPECIMN AEROBIC: CPT | Performed by: PHYSICIAN ASSISTANT

## 2023-07-19 PROCEDURE — 99283 EMERGENCY DEPT VISIT LOW MDM: CPT

## 2023-07-19 PROCEDURE — 87147 CULTURE TYPE IMMUNOLOGIC: CPT | Performed by: PHYSICIAN ASSISTANT

## 2023-07-19 PROCEDURE — 87491 CHLMYD TRACH DNA AMP PROBE: CPT | Performed by: PHYSICIAN ASSISTANT

## 2023-07-19 PROCEDURE — 87591 N.GONORRHOEAE DNA AMP PROB: CPT | Performed by: PHYSICIAN ASSISTANT

## 2023-07-19 NOTE — ED PROVIDER NOTES
History  Chief Complaint   Patient presents with   • Abdominal Pain     Pt c/o lower belly pain, pain with urination and "some" testicle pain. Pt states that he and his wife were both tested for STIs and the tests were all normal. Pt adds "increase" in general body aches     50-year-old male presents today for evaluation of multiple complaints first complaint is discomfort with urination. Patient reports he has intermittent abdominal discomfort which he states is not a pain however moves from his mid periumbilical area down to the suprapubic area "at times" while urinating. Patient reports this abdominal pain is not constant and does not come every time that he urinates however is intermittent. Patient reports he is also concerned as he recently had extramarital affairs and is concerned as he has been having penile dryness and concerns for sexually transmitted conditions. Patient reports that he was previously negative for gonorrhea and chlamydia and states he is less concerned for these conditions however agrees to testing. Patient reports no measured fevers. Patient states he is also concerned for sore throat and reports he has been engaging in oral intercourse as well          Prior to Admission Medications   Prescriptions Last Dose Informant Patient Reported? Taking?   brompheniramine-pseudoephedrine-DM 30-2-10 MG/5ML syrup   No No   Sig: Take 2.5 mL by mouth 4 (four) times a day as needed for congestion or cough   menthol-cetylpyridinium (CEPACOL) 3 MG lozenge   No No   Sig: Take 1 lozenge (3 mg total) by mouth as needed for sore throat   phenazopyridine (PYRIDIUM) 100 mg tablet   No No   Sig: Take 1 tablet (100 mg total) by mouth 3 (three) times a day as needed for bladder spasms      Facility-Administered Medications: None       History reviewed. No pertinent past medical history. History reviewed. No pertinent surgical history.     Family History   Problem Relation Age of Onset   • No Known Problems Mother    • Diabetes Father      I have reviewed and agree with the history as documented. E-Cigarette/Vaping   • E-Cigarette Use Never User      E-Cigarette/Vaping Substances     Social History     Tobacco Use   • Smoking status: Never     Passive exposure: Current   • Smokeless tobacco: Never   Vaping Use   • Vaping Use: Never used   Substance Use Topics   • Alcohol use: Yes   • Drug use: Yes     Types: Marijuana       Review of Systems   Constitutional: Negative for chills, fatigue and fever. HENT: Negative for congestion, ear pain, rhinorrhea and sore throat. Eyes: Negative for redness. Respiratory: Negative for chest tightness and shortness of breath. Cardiovascular: Negative for chest pain and palpitations. Gastrointestinal: Negative for abdominal pain, nausea and vomiting. Genitourinary: Positive for penile pain ( 'dryness'). Negative for dysuria and hematuria. Musculoskeletal: Negative. Skin: Negative for rash. Neurological: Negative for dizziness, syncope, light-headedness and numbness. Physical Exam  Physical Exam  Vitals and nursing note reviewed. Constitutional:       Appearance: Normal appearance. He is well-developed. HENT:      Head: Normocephalic and atraumatic. Eyes:      General: No scleral icterus. Pupils: Pupils are equal, round, and reactive to light. Cardiovascular:      Rate and Rhythm: Normal rate and regular rhythm. Pulses: Normal pulses. Pulmonary:      Effort: Pulmonary effort is normal. No respiratory distress. Breath sounds: No stridor. Abdominal:      General: There is no distension. Palpations: There is no mass. Genitourinary:     Penis: Uncircumcised. Nhan stage (genital): 5. Comments: RN at bedside Cecilia whatley for exam.  Patient with some dryness noted to the head of the penis, urethral meatus patent there is no discharge no erythematous lesions sores, no herpetic lesions or sores noted.   No testicular swelling appreciated  Musculoskeletal:      Cervical back: Normal range of motion. Skin:     General: Skin is warm and dry. Capillary Refill: Capillary refill takes less than 2 seconds. Coloration: Skin is not jaundiced. Neurological:      Mental Status: He is alert and oriented to person, place, and time. Gait: Gait normal.   Psychiatric:         Mood and Affect: Mood normal.         Vital Signs  ED Triage Vitals [07/19/23 0805]   Temperature Pulse Respirations Blood Pressure SpO2   (!) 97.3 °F (36.3 °C) 63 18 124/64 100 %      Temp Source Heart Rate Source Patient Position - Orthostatic VS BP Location FiO2 (%)   Tympanic Monitor Sitting Right arm --      Pain Score       --           Vitals:    07/19/23 0805   BP: 124/64   Pulse: 63   Patient Position - Orthostatic VS: Sitting         Visual Acuity      ED Medications  Medications - No data to display    Diagnostic Studies  Results Reviewed     Procedure Component Value Units Date/Time    UA w Reflex to Microscopic w Reflex to Culture [[id number]]     Lab Status: No result Specimen: Urine     Strep A PCR [[id number]]     Lab Status: No result Specimen: Throat     Chlamydia/GC amplified DNA by PCR [[id number]]     Lab Status: No result Specimen: Urine, Other     Throat culture [[id number]]     Lab Status: No result Specimen: Throat                  No orders to display              Procedures  Procedures         ED Course                                             Medical Decision Making  59-year-old sexually active male presents requesting testing for sexually transmitted conditions denies penile discharge rashes lesions or sores. Patient reports intermittent abdominal discomfort that at times occurs while urinating and at times is not present with urination.   Patient is also complaining of a sore throat is managing oral secretions without difficulty no stridor or uvular deviation appreciated vital signs do not meet SIRS criteria no indication for deep space infection therefore no blood work or imaging was pursued. Patient is agreeable to urinalysis, GC testing, strep and gonococcal pharyngitis testing. Patient declines empiric treatment. Patient given information to follow-up with the [facility] and family care provider for further testing. Patient is without notable rash, herpetic lesions, lesions concerning for syphilis or other potential STDs. All imaging and/or lab testing discussed with patient, strict return to ED precautions discussed. Patient and/or family members verbalizes understanding and agrees with plan. Patient is stable for discharge     Portions of the record may have been created with voice recognition software. Occasional wrong word or "sound a like" substitutions may have occurred due to the inherent limitations of voice recognition software. Read the chart carefully and recognize, using context, where substitutions have occurred. Amount and/or Complexity of Data Reviewed  Labs: ordered. Disposition  Final diagnoses:   Dysuria   Encounter for assessment of STD exposure     Time reflects when diagnosis was documented in both MDM as applicable and the Disposition within this note     Time User Action Codes Description Comment    7/19/2023  8:30 AM Liudmila Loza Add [R30.0] Dysuria     7/19/2023  8:30 AM Liudmila Loza Add [Z76.89] Encounter for assessment of STD exposure       ED Disposition     ED Disposition   Discharge    Condition   Stable    Date/Time   Wed Jul 19, 2023  8:30 AM    Comment   Vane Aguero discharge to home/self care.                Follow-up Information     Follow up With Specialties Details Why [address] [facility] Internal Medicine Schedule an appointment as soon as possible for a visit in 1 day for further testing [address], [address] [facility] [address] Angeline            Patient's Medications   Discharge Prescriptions    No medications on file       No discharge procedures on file.     PDMP Review     None          ED Provider  Electronically Signed by           Jina Rose PA-C  07/19/23 1794

## 2023-07-19 NOTE — DISCHARGE INSTRUCTIONS
We will call you with the results of your testing should any of them return positive. We will advise you of the next steps if you have any positive test results. We recommend you follow-up with both the [facility] and your family care provider for further testing. As you recently recovered from strep throat we recommend you throw away the toothbrush you are using from the time you were sick as this may be the cause of your sore throat at this time. If your throat testing returns positive for any bacteria we will contact you as well and advise you of the next steps. Please feel free to return to the emergency department for any severe concerns otherwise follow-up with the family care provider.

## 2023-07-20 LAB
C TRACH DNA SPEC QL NAA+PROBE: NEGATIVE
N GONORRHOEA DNA SPEC QL NAA+PROBE: NEGATIVE

## 2023-07-21 DIAGNOSIS — J02.0 STREP THROAT: Primary | ICD-10-CM

## 2023-07-21 PROBLEM — Z76.89 ENCOUNTER TO ESTABLISH CARE: Status: RESOLVED | Noted: 2021-03-19 | Resolved: 2023-07-21

## 2023-07-21 LAB — BACTERIA THROAT CULT: ABNORMAL

## 2023-07-21 RX ORDER — AMOXICILLIN 500 MG/1
500 TABLET, FILM COATED ORAL 2 TIMES DAILY
Qty: 20 TABLET | Refills: 0 | Status: SHIPPED | OUTPATIENT
Start: 2023-07-21 | End: 2023-07-31

## 2023-08-10 ENCOUNTER — OFFICE VISIT (OUTPATIENT)
Dept: UROLOGY | Facility: CLINIC | Age: 28
End: 2023-08-10
Payer: COMMERCIAL

## 2023-08-10 VITALS
HEART RATE: 68 BPM | OXYGEN SATURATION: 98 % | HEIGHT: 70 IN | SYSTOLIC BLOOD PRESSURE: 136 MMHG | BODY MASS INDEX: 24.67 KG/M2 | DIASTOLIC BLOOD PRESSURE: 70 MMHG | WEIGHT: 172.3 LBS

## 2023-08-10 DIAGNOSIS — R30.9 PAIN WITH URINATION: Primary | ICD-10-CM

## 2023-08-10 PROBLEM — N50.819 TESTICLE TENDERNESS: Status: RESOLVED | Noted: 2021-03-19 | Resolved: 2023-08-10

## 2023-08-10 LAB
POST-VOID RESIDUAL VOLUME, ML POC: 90 ML
SL AMB  POCT GLUCOSE, UA: NORMAL
SL AMB LEUKOCYTE ESTERASE,UA: NORMAL
SL AMB POCT BILIRUBIN,UA: NORMAL
SL AMB POCT BLOOD,UA: NORMAL
SL AMB POCT CLARITY,UA: CLEAR
SL AMB POCT COLOR,UA: YELLOW
SL AMB POCT KETONES,UA: NORMAL
SL AMB POCT NITRITE,UA: NORMAL
SL AMB POCT PH,UA: 7.5
SL AMB POCT SPECIFIC GRAVITY,UA: 1.01
SL AMB POCT URINE PROTEIN: NORMAL
SL AMB POCT UROBILINOGEN: 0.2

## 2023-08-10 PROCEDURE — 99204 OFFICE O/P NEW MOD 45 MIN: CPT | Performed by: UROLOGY

## 2023-08-10 PROCEDURE — 51798 US URINE CAPACITY MEASURE: CPT | Performed by: UROLOGY

## 2023-08-10 PROCEDURE — 81002 URINALYSIS NONAUTO W/O SCOPE: CPT | Performed by: UROLOGY

## 2023-08-10 NOTE — PROGRESS NOTES
UROLOGY NEW CONSULT NOTE     CHIEF COMPLAINT   Rod Duarte is a 28 y.o. male with a complaint of   Chief Complaint   Patient presents with   • New Patient Visit     dysuria       History of Present Illness:   Rod Duarte is a 28 y.o. male here for evaluation of urinary discomfort. Patient had several encounters with different sexual partners in June and developed some anxiety about mild discomfort with urination. Was seen in the emergency room and underwent 2 rounds of chlamydia and gonorrhea testing. Was seen at the [facility] STI clinic and has reported negative HIV testing. Patient symptoms have continued to improve with some rare urinary irritability. Patient reports he drinks copious amounts of caffeine. Denies pain in his groin or testicles, no blood in his urine. Now only sexually active with 1 partner. Past Medical History:   History reviewed. No pertinent past medical history. PAST SURGICAL HISTORY:   History reviewed. No pertinent surgical history. CURRENT MEDICATIONS:     Current Outpatient Medications   Medication Sig Dispense Refill   • phenazopyridine (PYRIDIUM) 100 mg tablet Take 1 tablet (100 mg total) by mouth 3 (three) times a day as needed for bladder spasms (Patient not taking: Reported on 8/10/2023) 10 tablet 0     No current facility-administered medications for this visit.        ALLERGIES:   No Known Allergies    SOCIAL HISTORY:     Social History     Socioeconomic History   • Marital status: Single     Spouse name: None   • Number of children: None   • Years of education: None   • Highest education level: None   Occupational History   • None   Tobacco Use   • Smoking status: Never     Passive exposure: Current   • Smokeless tobacco: Never   Vaping Use   • Vaping Use: Every day   Substance and Sexual Activity   • Alcohol use: Yes     Comment: occ   • Drug use: Yes     Types: Marijuana     Comment: trying to quit   • Sexual activity: None   Other Topics Concern   • None   Social History Narrative   • None     Social Determinants of Health     Financial Resource Strain: Low Risk  (1/24/2023)    Overall Financial Resource Strain (CARDIA)    • Difficulty of Paying Living Expenses: Not hard at all   Food Insecurity: No Food Insecurity (1/24/2023)    Hunger Vital Sign    • Worried About Running Out of Food in the Last Year: Never true    • Ran Out of Food in the Last Year: Never true   Transportation Needs: No Transportation Needs (1/24/2023)    PRAPARE - Transportation    • Lack of Transportation (Medical): No    • Lack of Transportation (Non-Medical): No   Physical Activity: Not on file   Stress: Not on file   Social Connections: Not on file   Intimate Partner Violence: Not on file   Housing Stability: Not on file       SOCIAL HISTORY:     Family History   Problem Relation Age of Onset   • No Known Problems Mother    • Diabetes Father        REVIEW OF SYSTEMS:     Review of Systems   Constitutional: Negative for chills and fever. HENT: Negative for ear pain and sore throat. Eyes: Negative for pain and visual disturbance. Respiratory: Negative for cough and shortness of breath. Cardiovascular: Negative for chest pain and palpitations. Gastrointestinal: Negative for abdominal pain and vomiting. Genitourinary: Negative for dysuria and hematuria. Musculoskeletal: Negative for arthralgias and back pain. Skin: Negative for color change and rash. Neurological: Negative for seizures and syncope. All other systems reviewed and are negative. PHYSICAL EXAM:     /70 (BP Location: Left arm, Patient Position: Sitting, Cuff Size: Adult)   Pulse 68   Ht 5' 10" (1.778 m)   Wt 78.2 kg (172 lb 4.8 oz)   SpO2 98%   BMI 24.72 kg/m²     Physical Exam  Vitals reviewed. Constitutional:       General: He is not in acute distress. Appearance: He is well-developed. HENT:      Head: Normocephalic and atraumatic.    Eyes:      Pupils: Pupils are equal, round, and reactive to light. Cardiovascular:      Rate and Rhythm: Normal rate. Pulmonary:      Effort: Pulmonary effort is normal. No respiratory distress. Breath sounds: Normal breath sounds. Abdominal:      General: There is no distension. Palpations: Abdomen is soft. Tenderness: There is no abdominal tenderness. Genitourinary:     Penis: Normal.       Testes: Normal.   Musculoskeletal:         General: Normal range of motion. Cervical back: Normal range of motion and neck supple. Skin:     General: Skin is warm and dry. Neurological:      Mental Status: He is alert and oriented to person, place, and time. Psychiatric:         Behavior: Behavior normal.         LABS:     CBC:   Lab Results   Component Value Date    WBC 6.09 01/31/2015    HGB 13.4 01/31/2015    HCT 39.9 01/31/2015    MCV 79 (L) 01/31/2015     01/31/2015       BMP:   Lab Results   Component Value Date    GLUCOSE 96 01/31/2015    CALCIUM 8.9 01/31/2015     01/31/2015    K 4.0 01/31/2015    CO2 30 01/31/2015     01/31/2015    BUN 14 01/31/2015    CREATININE 0.77 01/31/2015     PROCEDURE:     Recent Results (from the past 2 hour(s))   POCT urine dip    Collection Time: 08/10/23  3:45 PM   Result Value Ref Range    LEUKOCYTE ESTERASE,UA -     NITRITE,UA -     SL AMB POCT UROBILINOGEN 0.2     POCT URINE PROTEIN -      PH,UA 7.5     BLOOD,UA -     SPECIFIC GRAVITY,UA 1.010     KETONES,UA -     BILIRUBIN,UA -     GLUCOSE, UA -      COLOR,UA yellow     CLARITY,UA clear    POCT Measure PVR    Collection Time: 08/10/23  3:45 PM   Result Value Ref Range    POST-VOID RESIDUAL VOLUME, ML POC 90 mL   ]    ASSESSMENT:     28 y.o. male  with resolving dysuria    PLAN:     The patient is having resolving symptoms after his July emergency room visit. I suspect the patient was anxious about a unprotected sexual encounter and potential STI development.   This led to multiple rounds of STI testing which are negative. Patient reports his symptoms are improving. He does drink copious amounts of caffeine at work and thinks this may be a  of some very mild residual irritation with urination. I have performed a careful exam of the patient's penis and testicles without evidence of concern for mass or inguinal adenopathy. I do not think there is any additional indication for testing at this time as the patient's urinalysis is negative. He is comfortable with behavioral modification and will contact me back if the issues recur or progress.

## 2023-11-15 ENCOUNTER — APPOINTMENT (EMERGENCY)
Dept: RADIOLOGY | Facility: HOSPITAL | Age: 28
End: 2023-11-15
Payer: COMMERCIAL

## 2023-11-15 ENCOUNTER — HOSPITAL ENCOUNTER (EMERGENCY)
Facility: HOSPITAL | Age: 28
Discharge: HOME | End: 2023-11-15
Attending: EMERGENCY MEDICINE
Payer: COMMERCIAL

## 2023-11-15 VITALS
OXYGEN SATURATION: 100 % | BODY MASS INDEX: 24.93 KG/M2 | DIASTOLIC BLOOD PRESSURE: 70 MMHG | RESPIRATION RATE: 18 BRPM | TEMPERATURE: 98.2 F | WEIGHT: 173.72 LBS | HEART RATE: 64 BPM | SYSTOLIC BLOOD PRESSURE: 130 MMHG

## 2023-11-15 DIAGNOSIS — J20.9 ACUTE BRONCHITIS: Primary | ICD-10-CM

## 2023-11-15 LAB
ALBUMIN SERPL BCP-MCNC: 4.4 G/DL (ref 3.5–5)
ALP SERPL-CCNC: 52 U/L (ref 34–104)
ALT SERPL W P-5'-P-CCNC: 15 U/L (ref 7–52)
ANION GAP SERPL CALCULATED.3IONS-SCNC: 1 MMOL/L
AST SERPL W P-5'-P-CCNC: 18 U/L (ref 13–39)
ATRIAL RATE: 60 BPM
BASOPHILS # BLD AUTO: 0.04 THOUSANDS/ÂΜL (ref 0–0.1)
BASOPHILS NFR BLD AUTO: 1 % (ref 0–1)
BILIRUB SERPL-MCNC: 0.52 MG/DL (ref 0.2–1)
BUN SERPL-MCNC: 17 MG/DL (ref 5–25)
CALCIUM SERPL-MCNC: 9.6 MG/DL (ref 8.4–10.2)
CARDIAC TROPONIN I PNL SERPL HS: 2 NG/L
CHLORIDE SERPL-SCNC: 105 MMOL/L (ref 96–108)
CO2 SERPL-SCNC: 31 MMOL/L (ref 21–32)
CREAT SERPL-MCNC: 0.85 MG/DL (ref 0.6–1.3)
EOSINOPHIL # BLD AUTO: 0.09 THOUSAND/ÂΜL (ref 0–0.61)
EOSINOPHIL NFR BLD AUTO: 1 % (ref 0–6)
ERYTHROCYTE [DISTWIDTH] IN BLOOD BY AUTOMATED COUNT: 13.1 % (ref 11.6–15.1)
GFR SERPL CREATININE-BSD FRML MDRD: 117 ML/MIN/1.73SQ M
GLUCOSE SERPL-MCNC: 100 MG/DL (ref 65–140)
HCT VFR BLD AUTO: 46.2 % (ref 36.5–49.3)
HGB BLD-MCNC: 15.1 G/DL (ref 12–17)
IMM GRANULOCYTES # BLD AUTO: 0.01 THOUSAND/UL (ref 0–0.2)
IMM GRANULOCYTES NFR BLD AUTO: 0 % (ref 0–2)
LYMPHOCYTES # BLD AUTO: 2.16 THOUSANDS/ÂΜL (ref 0.6–4.47)
LYMPHOCYTES NFR BLD AUTO: 33 % (ref 14–44)
MCH RBC QN AUTO: 26.8 PG (ref 26.8–34.3)
MCHC RBC AUTO-ENTMCNC: 32.7 G/DL (ref 31.4–37.4)
MCV RBC AUTO: 82 FL (ref 82–98)
MONOCYTES # BLD AUTO: 0.44 THOUSAND/ÂΜL (ref 0.17–1.22)
MONOCYTES NFR BLD AUTO: 7 % (ref 4–12)
NEUTROPHILS # BLD AUTO: 3.87 THOUSANDS/ÂΜL (ref 1.85–7.62)
NEUTS SEG NFR BLD AUTO: 58 % (ref 43–75)
NRBC BLD AUTO-RTO: 0 /100 WBCS
P AXIS: 63 DEGREES
PLATELET # BLD AUTO: 179 THOUSANDS/UL (ref 149–390)
PMV BLD AUTO: 11.4 FL (ref 8.9–12.7)
POTASSIUM SERPL-SCNC: 4.6 MMOL/L (ref 3.5–5.3)
PR INTERVAL: 150 MS
PROT SERPL-MCNC: 7.3 G/DL (ref 6.4–8.4)
QRS AXIS: 76 DEGREES
QRSD INTERVAL: 82 MS
QT INTERVAL: 380 MS
QTC INTERVAL: 380 MS
RBC # BLD AUTO: 5.64 MILLION/UL (ref 3.88–5.62)
SODIUM SERPL-SCNC: 137 MMOL/L (ref 135–147)
T WAVE AXIS: 41 DEGREES
VENTRICULAR RATE: 60 BPM
WBC # BLD AUTO: 6.61 THOUSAND/UL (ref 4.31–10.16)

## 2023-11-15 PROCEDURE — 85025 COMPLETE CBC W/AUTO DIFF WBC: CPT | Performed by: EMERGENCY MEDICINE

## 2023-11-15 PROCEDURE — 99285 EMERGENCY DEPT VISIT HI MDM: CPT | Performed by: EMERGENCY MEDICINE

## 2023-11-15 PROCEDURE — 93010 ELECTROCARDIOGRAM REPORT: CPT | Performed by: INTERNAL MEDICINE

## 2023-11-15 PROCEDURE — 94640 AIRWAY INHALATION TREATMENT: CPT

## 2023-11-15 PROCEDURE — 84484 ASSAY OF TROPONIN QUANT: CPT | Performed by: EMERGENCY MEDICINE

## 2023-11-15 PROCEDURE — 93005 ELECTROCARDIOGRAM TRACING: CPT

## 2023-11-15 PROCEDURE — 71045 X-RAY EXAM CHEST 1 VIEW: CPT

## 2023-11-15 PROCEDURE — 80053 COMPREHEN METABOLIC PANEL: CPT | Performed by: EMERGENCY MEDICINE

## 2023-11-15 PROCEDURE — 36415 COLL VENOUS BLD VENIPUNCTURE: CPT

## 2023-11-15 PROCEDURE — 99285 EMERGENCY DEPT VISIT HI MDM: CPT

## 2023-11-15 RX ORDER — ALBUTEROL SULFATE 2.5 MG/3ML
5 SOLUTION RESPIRATORY (INHALATION) ONCE
Status: COMPLETED | OUTPATIENT
Start: 2023-11-15 | End: 2023-11-15

## 2023-11-15 RX ORDER — ALBUTEROL SULFATE 90 UG/1
2 AEROSOL, METERED RESPIRATORY (INHALATION) EVERY 4 HOURS PRN
Qty: 18 G | Refills: 0 | Status: SHIPPED | OUTPATIENT
Start: 2023-11-15

## 2023-11-15 RX ADMIN — ALBUTEROL SULFATE 5 MG: 2.5 SOLUTION RESPIRATORY (INHALATION) at 11:16

## 2023-11-15 NOTE — Clinical Note
Davy Mcguire was seen and treated in our emergency department on 11/15/2023. Diagnosis:     Shakeel Roberts  is off the rest of the shift today. He may return on this date: If you have any questions or concerns, please don't hesitate to call.       Mc Ybarra MD    ______________________________           _______________          _______________  Hospital Representative                              Date                                Time

## 2023-11-15 NOTE — ED PROVIDER NOTES
History  Chief Complaint   Patient presents with    Chest Pain     Patient c/o chest pain that he describes as a pressure for 2 months. Patient also reports having chills last night. Denies leg swelling or SOB. Patient thinks his job is causing this pain due to working out in the cold. HPI  On and off chest discomfort that he has been having for the last several months. He has no known history of significant cardiac or pulmonary disease. He does smoke. He also works outdoors with asphalt. No fevers or chills. No abdominal pain nausea or vomiting. First nurse protocol was initiated prior to my going into the room. None       History reviewed. No pertinent past medical history. History reviewed. No pertinent surgical history. Family History   Problem Relation Age of Onset    No Known Problems Mother     Diabetes Father      I have reviewed and agree with the history as documented. E-Cigarette/Vaping    E-Cigarette Use Current Every Day User      E-Cigarette/Vaping Substances    Nicotine Yes     Flavoring Yes      Social History     Tobacco Use    Smoking status: Never     Passive exposure: Current    Smokeless tobacco: Never   Vaping Use    Vaping Use: Every day    Substances: Nicotine, Flavoring   Substance Use Topics    Alcohol use: Yes     Comment: occ    Drug use: Yes     Types: Marijuana     Comment: trying to quit       Review of Systems    Physical Exam  Physical Exam  Vitals and nursing note reviewed. Constitutional:       General: He is not in acute distress. Appearance: Normal appearance. He is well-developed. He is not ill-appearing, toxic-appearing or diaphoretic. HENT:      Head: Normocephalic and atraumatic. Right Ear: Hearing normal. No drainage or swelling. Left Ear: Hearing normal. No drainage or swelling. Eyes:      General: Lids are normal.         Right eye: No discharge. Left eye: No discharge.       Conjunctiva/sclera: Conjunctivae normal.   Neck: Vascular: No JVD. Trachea: Trachea normal.   Cardiovascular:      Rate and Rhythm: Normal rate and regular rhythm. Pulses: Normal pulses. Heart sounds: Normal heart sounds. No murmur heard. No friction rub. No gallop. Pulmonary:      Effort: Pulmonary effort is normal. No respiratory distress. Breath sounds: No stridor. Wheezing present. No rales. Chest:      Chest wall: No tenderness. Abdominal:      Palpations: Abdomen is soft. Tenderness: There is no abdominal tenderness. There is no guarding or rebound. Musculoskeletal:         General: Normal range of motion. Cervical back: Normal range of motion. Right lower leg: No edema. Left lower leg: No edema. Skin:     General: Skin is warm and dry. Coloration: Skin is not pale. Findings: No rash. Neurological:      General: No focal deficit present. Mental Status: He is alert. GCS: GCS eye subscore is 4. GCS verbal subscore is 5. GCS motor subscore is 6. Motor: No abnormal muscle tone. Psychiatric:         Mood and Affect: Mood normal.         Speech: Speech normal.         Behavior: Behavior is cooperative.          Vital Signs  ED Triage Vitals [11/15/23 0955]   Temperature Pulse Respirations Blood Pressure SpO2   98.2 °F (36.8 °C) 64 18 130/70 100 %      Temp Source Heart Rate Source Patient Position - Orthostatic VS BP Location FiO2 (%)   Oral Monitor Lying Left arm --      Pain Score       --           Vitals:    11/15/23 0955   BP: 130/70   Pulse: 64   Patient Position - Orthostatic VS: Lying         Visual Acuity      ED Medications  Medications   albuterol inhalation solution 5 mg (5 mg Nebulization Given 11/15/23 1116)       Diagnostic Studies  Results Reviewed       Procedure Component Value Units Date/Time    HS Troponin 0hr (reflex protocol) [[id number]]  (Normal) Collected: 11/15/23 1002    Lab Status: Final result Specimen: Blood from Arm, Left Updated: 11/15/23 1041 hs TnI 0hr 2 ng/L     Comprehensive metabolic panel [[id number]] Collected: 11/15/23 1002    Lab Status: Final result Specimen: Blood from Arm, Left Updated: 11/15/23 1033     Sodium 137 mmol/L      Potassium 4.6 mmol/L      Chloride 105 mmol/L      CO2 31 mmol/L      ANION GAP 1 mmol/L      BUN 17 mg/dL      Creatinine 0.85 mg/dL      Glucose 100 mg/dL      Calcium 9.6 mg/dL      AST 18 U/L      ALT 15 U/L      Alkaline Phosphatase 52 U/L      Total Protein 7.3 g/dL      Albumin 4.4 g/dL      Total Bilirubin 0.52 mg/dL      eGFR 117 ml/min/1.73sq m     Narrative:      National Kidney Disease Foundation guidelines for Chronic Kidney Disease (CKD):     Stage 1 with normal or high GFR (GFR > 90 mL/min/1.73 square meters)    Stage 2 Mild CKD (GFR = 60-89 mL/min/1.73 square meters)    Stage 3A Moderate CKD (GFR = 45-59 mL/min/1.73 square meters)    Stage 3B Moderate CKD (GFR = 30-44 mL/min/1.73 square meters)    Stage 4 Severe CKD (GFR = 15-29 mL/min/1.73 square meters)    Stage 5 End Stage CKD (GFR <15 mL/min/1.73 square meters)  Note: GFR calculation is accurate only with a steady state creatinine    CBC and differential [[id number]]  (Abnormal) Collected: 11/15/23 1002    Lab Status: Final result Specimen: Blood from Arm, Left Updated: 11/15/23 1009     WBC 6.61 Thousand/uL      RBC 5.64 Million/uL      Hemoglobin 15.1 g/dL      Hematocrit 46.2 %      MCV 82 fL      MCH 26.8 pg      MCHC 32.7 g/dL      RDW 13.1 %      MPV 11.4 fL      Platelets 695 Thousands/uL      nRBC 0 /100 WBCs      Neutrophils Relative 58 %      Immat GRANS % 0 %      Lymphocytes Relative 33 %      Monocytes Relative 7 %      Eosinophils Relative 1 %      Basophils Relative 1 %      Neutrophils Absolute 3.87 Thousands/µL      Immature Grans Absolute 0.01 Thousand/uL      Lymphocytes Absolute 2.16 Thousands/µL      Monocytes Absolute 0.44 Thousand/µL      Eosinophils Absolute 0.09 Thousand/µL      Basophils Absolute 0.04 Thousands/µL XR chest 1 view portable   ED Interpretation by Alexei Mahan MD (11/15 1102)   I have reviewed the film, per my independent interpretation : No infiltrate or pneumothorax or pneumomediastinum. No acute disease. .        Final Result by Pete Billingsley MD (11/15 1055)      No acute cardiopulmonary disease. Workstation performed: [id number]                    Procedures  ECG 12 Lead Documentation Only    Date/Time: 11/15/2023 1:00 PM    Performed by: Alexei Mahan MD  Authorized by: Alexei Mahan MD    Indications / Diagnosis:  Chest pain  ECG reviewed by me, the ED Provider: yes    Patient location:  ED  Previous ECG:     Comparison to cardiac monitor: Yes    Interpretation:     Interpretation: normal    Rate:     ECG rate:  59    ECG rate assessment: normal    Rhythm:     Rhythm: sinus rhythm and sinus bradycardia    Ectopy:     Ectopy: none    QRS:     QRS axis:  Normal  Conduction:     Conduction: normal    ST segments:     ST segments:  Normal  T waves:     T waves: normal             ED Course  ED Course as of 11/15/23 1810   Wed Nov 15, 2023   1101 CBC and differential(!)  Normal white count hemoglobin and platelet count. 1101 hs TnI 0hr: 2  Negative. EKG shows no acute changes. 1101 Comprehensive metabolic panel  Normal electrolytes renal function and liver function tests. 1134 Feeling better after the albuterol. HEART Risk Score      Flowsheet Row Most Recent Value   Heart Score Risk Calculator    History 0 Filed at: 11/15/2023 1809   ECG 0 Filed at: 11/15/2023 1809   Age 0 Filed at: 11/15/2023 1809   Risk Factors 1 Filed at: 11/15/2023 1809   Troponin 0 Filed at: 11/15/2023 1809   HEART Score 1 Filed at: 11/15/2023 1809                          SBIRT 20yo+      Flowsheet Row Most Recent Value   Initial Alcohol Screen: US AUDIT-C     1. How often do you have a drink containing alcohol? 0 Filed at: 11/15/2023 1010   2.  How many drinks containing alcohol do you have on a typical day you are drinking? 0 Filed at: 11/15/2023 1010   3a. Male UNDER 65: How often do you have five or more drinks on one occasion? 0 Filed at: 11/15/2023 1010   Audit-C Score 0 Filed at: 11/15/2023 1010   NILSON: How many times in the past year have you. .. Used an illegal drug or used a prescription medication for non-medical reasons? 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 We will send a prescription of albuterol to the pharmacy. There is no pneumothorax or pneumonia seen on his x-ray. His EKG is unremarkable and his cardiac work-up is essentially negative and I do not think this is cardiac at all given the fact that is been going on for several months and he is young. We will treat as an outpatient does not require inpatient hospitalization. No signs of any active infection at the present time. Amount and/or Complexity of Data Reviewed  Labs: ordered. Decision-making details documented in ED Course. Radiology: ordered. Risk  Prescription drug management. Disposition  Final diagnoses:   Acute bronchitis     Time reflects when diagnosis was documented in both MDM as applicable and the Disposition within this note       Time User Action Codes Description Comment    11/15/2023 11:35 AM Rossana Smoke Add [J20.9] Acute bronchitis           ED Disposition       ED Disposition   Discharge    Condition   Stable    Date/Time   Wed Nov 15, 2023 1135    [facility]                    Follow-up Information       Follow up With Specialties Details Why Rom Vila MD Family Medicine Schedule an appointment as soon as possible for a visit  As needed, If symptoms worsen Rolo Colmenares  [id number]              Discharge Medication List as of 11/15/2023 11:37 AM        START taking these medications    Details   albuterol (PROVENTIL HFA,VENTOLIN HFA) 90 mcg/act inhaler Inhale 2 puffs every 4 (four) hours as needed for wheezing, Starting Wed 11/15/2023, Normal             No discharge procedures on file.     PDMP Review       None            ED Provider  Electronically Signed by             Sophie Reyna MD  11/15/23 6264

## 2023-11-17 ENCOUNTER — OFFICE VISIT (OUTPATIENT)
Dept: FAMILY MEDICINE CLINIC | Facility: CLINIC | Age: 28
End: 2023-11-17

## 2023-11-17 VITALS
OXYGEN SATURATION: 98 % | BODY MASS INDEX: 25 KG/M2 | WEIGHT: 174.2 LBS | SYSTOLIC BLOOD PRESSURE: 110 MMHG | HEART RATE: 65 BPM | DIASTOLIC BLOOD PRESSURE: 60 MMHG

## 2023-11-17 DIAGNOSIS — R07.82 INTERCOSTAL PAIN: ICD-10-CM

## 2023-11-17 DIAGNOSIS — R06.2 WHEEZING: ICD-10-CM

## 2023-11-17 DIAGNOSIS — K21.9 GERD WITHOUT ESOPHAGITIS: ICD-10-CM

## 2023-11-17 DIAGNOSIS — R07.89 OTHER CHEST PAIN: Primary | ICD-10-CM

## 2023-11-17 PROCEDURE — 99213 OFFICE O/P EST LOW 20 MIN: CPT | Performed by: FAMILY MEDICINE

## 2023-11-17 RX ORDER — LIDOCAINE 50 MG/G
1 PATCH TOPICAL DAILY
Qty: 30 PATCH | Refills: 0 | Status: SHIPPED | OUTPATIENT
Start: 2023-11-17

## 2023-11-17 RX ORDER — PANTOPRAZOLE SODIUM 20 MG/1
20 TABLET, DELAYED RELEASE ORAL
Qty: 30 TABLET | Refills: 5 | Status: SHIPPED | OUTPATIENT
Start: 2023-11-17 | End: 2024-05-15

## 2023-11-17 NOTE — ASSESSMENT & PLAN NOTE
Musculoskeletal in the setting of shoveling at work vs costochondirits vs asthma (as exposed to inhalants and asfalt at work with no protective mask on and recent ED visit for acute bronchitis and wheezing) vs GERD as reports burning sensation on occasions after eating certain foods     Lidocaine patches every 12 hours for chest pain  Referral to physical therapy for evaluation and further treatment if needed  Will start Protonix 20 mg daily for GERD as possibility of chest pain  Avoidance of foods that may aggravate heart burn sx. Elevate head during sleeping  Complete PFTs with bronchodilator to rule out asthma  Continue the use of albuterol. Discussed in office how to use inhaler, patient demonstrated understanding.

## 2023-11-17 NOTE — PATIENT INSTRUCTIONS
- Lidocaine patches every 12 h, apply to painful area and Bengay cream as needed on the painful area   - Protonix daily and follow up in 2 weeks in the office   - Call the number bellow to schedule Pulmonary function test to rule out asthma     Please call Central Scheduling to schedule your appointment for PFTs. Number:       [facility] [place] [facility]adden johnna.     Ext [phone]

## 2023-11-17 NOTE — PROGRESS NOTES
Name: Lucas Sapp      : 1995      MRN: [id number]  Encounter Provider: Julianna Burns MD  Encounter Date: 2023   Encounter department: [facility],[address]     1. Other chest pain  Assessment & Plan:  Musculoskeletal in the setting of shoveling at work vs costochondirits vs asthma (as exposed to inhalants and asfalt at work with no protective mask on and recent ED visit for acute bronchitis and wheezing) vs GERD as reports burning sensation on occasions after eating certain foods     Lidocaine patches every 12 hours for chest pain  Referral to physical therapy for evaluation and further treatment if needed  Will start Protonix 20 mg daily for GERD as possibility of chest pain  Avoidance of foods that may aggravate heart burn sx. Elevate head during sleeping  Complete PFTs with bronchodilator to rule out asthma  Continue the use of albuterol. Discussed in office how to use inhaler, patient demonstrated understanding. 2. Intercostal pain  Orders:  -     lidocaine (Lidoderm) 5 %; Apply 1 patch topically over 12 hours daily Remove & Discard patch within 12 hours or as directed by MD  -     Ambulatory Referral to Physical Therapy; Future    3. GERD without esophagitis  -     pantoprazole (PROTONIX) 20 mg tablet; Take 1 tablet (20 mg total) by mouth daily before breakfast    4. Wheezing  -     Complete PFT with post bronchodilator; Future        Depression Screening and Follow-up Plan: Patient was screened for depression during today's encounter. They screened negative with a PHQ-2 score of 0. Subjective      32 yo male patient with no significant PMH comes to the office for follow up of chest pain. The pain is located in the right rib area going to the middle of the chest and left area, for the past 2 months. The pain is uncomfortable, sometimes like stabbing pain.  Patient also described burning pain in the retrosternal area. It occurs at rest and is constant. Recent ED visit - patient had EKG, CBC, CMP, Trops, all unremarkable. At the time, he was diagnosed with acute bronchitis and given albuterol inhaler with no improvement in symptoms. The pain still persists and patient believes it might be musculoskeletal in nature. 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 Review of Systems   Constitutional:  Negative for chills and fever. HENT:  Negative for ear pain and sore throat. Eyes:  Negative for pain and visual disturbance. Respiratory:  Negative for cough, shortness of breath and wheezing. Cardiovascular:  Negative for chest pain and palpitations. Gastrointestinal:  Negative for abdominal pain and vomiting. Genitourinary:  Negative for dysuria and hematuria. Musculoskeletal:  Negative for arthralgias and back pain. Skin:  Negative for color change and rash. Neurological:  Negative for seizures and syncope. All other systems reviewed and are negative. Current Outpatient Medications on File Prior to Visit   Medication Sig    albuterol (PROVENTIL HFA,VENTOLIN HFA) 90 mcg/act inhaler Inhale 2 puffs every 4 (four) hours as needed for wheezing       Objective     /60 (BP Location: Right arm, Patient Position: Sitting, Cuff Size: Standard)   Pulse 65   Wt 79 kg (174 lb 3.2 oz)   SpO2 98%   BMI 25.00 kg/m²     Physical Exam  Constitutional:       General: He is not in acute distress. Appearance: He is normal weight. He is not ill-appearing or toxic-appearing. HENT:      Head: Normocephalic and atraumatic. Right Ear: External ear normal. There is no impacted cerumen. Left Ear: External ear normal. There is no impacted cerumen. Nose: Nose normal. No congestion or rhinorrhea. Mouth/Throat:      Mouth: Mucous membranes are moist.      Pharynx: Oropharynx is clear. No posterior oropharyngeal erythema.    Eyes: General: No scleral icterus. Extraocular Movements: Extraocular movements intact. Neck:      Vascular: No carotid bruit. Cardiovascular:      Rate and Rhythm: Normal rate and regular rhythm. Pulses: Normal pulses. Heart sounds: Normal heart sounds. No murmur heard. No friction rub. Pulmonary:      Effort: Pulmonary effort is normal. No respiratory distress. Breath sounds: Normal breath sounds. No wheezing. Abdominal:      General: Abdomen is flat. Bowel sounds are normal. There is no distension. Palpations: Abdomen is soft. Tenderness: There is no abdominal tenderness. Musculoskeletal:         General: Normal range of motion. Cervical back: No rigidity. Right lower leg: No edema. Left lower leg: No edema. Lymphadenopathy:      Cervical: No cervical adenopathy. Skin:     General: Skin is warm. Coloration: Skin is not jaundiced. Findings: No erythema or rash. Neurological:      General: No focal deficit present. Mental Status: He is alert.    Psychiatric:         Mood and Affect: Mood normal.       Sunil Nicolas MD

## 2023-12-21 PROBLEM — R07.9 CHEST PAIN: Status: ACTIVE | Noted: 2023-08-09

## 2024-09-23 ENCOUNTER — APPOINTMENT (OUTPATIENT)
Dept: LAB | Facility: HOSPITAL | Age: 29
End: 2024-09-23
Payer: COMMERCIAL

## 2024-09-23 ENCOUNTER — OFFICE VISIT (OUTPATIENT)
Dept: FAMILY MEDICINE CLINIC | Facility: CLINIC | Age: 29
End: 2024-09-23

## 2024-09-23 VITALS
TEMPERATURE: 98 F | HEART RATE: 69 BPM | SYSTOLIC BLOOD PRESSURE: 120 MMHG | DIASTOLIC BLOOD PRESSURE: 84 MMHG | RESPIRATION RATE: 19 BRPM | HEIGHT: 69 IN | OXYGEN SATURATION: 98 % | BODY MASS INDEX: 23.25 KG/M2 | WEIGHT: 157 LBS

## 2024-09-23 DIAGNOSIS — R68.89 FLU-LIKE SYMPTOMS: ICD-10-CM

## 2024-09-23 DIAGNOSIS — R61 NIGHT SWEATS: ICD-10-CM

## 2024-09-23 DIAGNOSIS — R61 NIGHT SWEATS: Primary | ICD-10-CM

## 2024-09-23 PROBLEM — J02.9 PHARYNGITIS: Status: RESOLVED | Noted: 2023-06-14 | Resolved: 2024-09-23

## 2024-09-23 PROBLEM — R30.9 PAIN WITH URINATION: Status: RESOLVED | Noted: 2023-06-14 | Resolved: 2024-09-23

## 2024-09-23 LAB
SARS-COV-2 AG UPPER RESP QL IA: NEGATIVE
SL AMB POCT RAPID FLU A: NEGATIVE
SL AMB POCT RAPID FLU B: NEGATIVE
VALID CONTROL: NORMAL

## 2024-09-23 PROCEDURE — 87389 HIV-1 AG W/HIV-1&-2 AB AG IA: CPT

## 2024-09-23 PROCEDURE — 36415 COLL VENOUS BLD VENIPUNCTURE: CPT

## 2024-09-23 PROCEDURE — 99213 OFFICE O/P EST LOW 20 MIN: CPT | Performed by: FAMILY MEDICINE

## 2024-09-23 PROCEDURE — 87811 SARS-COV-2 COVID19 W/OPTIC: CPT | Performed by: FAMILY MEDICINE

## 2024-09-23 PROCEDURE — 87804 INFLUENZA ASSAY W/OPTIC: CPT | Performed by: FAMILY MEDICINE

## 2024-09-23 PROCEDURE — 86480 TB TEST CELL IMMUN MEASURE: CPT

## 2024-09-23 NOTE — ASSESSMENT & PLAN NOTE
Flu-like Symptoms with positive sick contacts.  Recent travel history.  Multiple episodes of similar symptoms in the past year.  Last HIV test in July 2023 was negative. Patient has had no possible exposures.   POCT COVID/FLU is negative. Illness likely 2/2 to another virus.     PLAN:  - Continue with OTC medications for flu-like symptoms. Rest and adequate fluid encouraged.   - Rule-out HIV    Orders:    POCT Rapid Covid Ag    POCT rapid flu A and B    HIV 1/2 AG/AB w Reflex [facility] for 2 yr old and above; Future

## 2024-09-23 NOTE — PROGRESS NOTES
Ambulatory Visit  Name: Booker Melendez      : 1995      MRN: [id number]  Encounter Provider: Hilda Madrid MD  Encounter Date: 2024   Encounter department: [facility]    Assessment & Plan  Night sweats  Night sweats with chills. Similar symptoms in the past.  No history of exposure to TB. Does not live in a confined space.    PLAN  - Will test to rule-out TB    Orders:    Quantiferon TB Gold Plus Assay; Future    HIV 1/2 AG/AB w Reflex [facility] for 2 yr old and above; Future    Flu-like symptoms  Flu-like Symptoms with positive sick contacts.  Recent travel history.  Multiple episodes of similar symptoms in the past year.  Last HIV test in 2023 was negative. Patient has had no possible exposures.   POCT COVID/FLU is negative. Illness likely 2/2 to another virus.     PLAN:  - Continue with OTC medications for flu-like symptoms. Rest and adequate fluid encouraged.   - Rule-out HIV    Orders:    POCT Rapid Covid Ag    POCT rapid flu A and B    HIV 1/2 AG/AB w Reflex [facility] for 2 yr old and above; Future       History of Present Illness     Booker Melendez is a 28 y.o. male with no significant past medical history presenting for same-day visit.  Patient is here with his partner.  Patient states has been having night sweats and flulike symptoms for the past 4 days.  He has a history of recent travel to [place] and came back last Thursday, .  He denies any exposure while he was away although, he states his children are currently sick with similar flu-like symptoms.  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  Patient is mostly concerned about his night sweats that wake him up frequently.  He describes it as soaking wet and having to wipe himself off with a towel.  He also admits to chills.  No fever endorsed.  Other symptoms endorsed or denied per ROS.         History obtained  "from : patient  Review of Systems   Constitutional:  Positive for chills and unexpected weight change (In 2020). Negative for fatigue and fever.   HENT:  Positive for congestion and rhinorrhea. Negative for ear discharge, facial swelling, postnasal drip, sinus pressure, sinus pain, sneezing, sore throat, trouble swallowing and voice change.    Eyes: Negative.    Respiratory:  Negative for cough, chest tightness, shortness of breath and wheezing.    Cardiovascular:  Negative for chest pain and palpitations.   Gastrointestinal:  Negative for abdominal pain, diarrhea, nausea and vomiting.   Endocrine: Negative.    Genitourinary: Negative.    Musculoskeletal: Negative.    Skin: Negative.    Neurological: Negative.            Objective     /84 (BP Location: Left arm, Patient Position: Sitting, Cuff Size: Standard)   Pulse 69   Temp 98 °F (36.7 °C) (Temporal)   Resp 19   Ht 5' 9\" (1.753 m)   Wt 71.2 kg (157 lb)   SpO2 98%   BMI 23.18 kg/m²     Physical Exam  Vitals and nursing note reviewed.   Constitutional:       General: He is not in acute distress.     Appearance: Normal appearance. He is well-developed. He is not ill-appearing or diaphoretic.   HENT:      Head: Normocephalic and atraumatic.      Right Ear: External ear normal.      Left Ear: External ear normal.      Nose: Congestion and rhinorrhea present.      Mouth/Throat:      Mouth: Mucous membranes are moist.      Pharynx: No oropharyngeal exudate or posterior oropharyngeal erythema.   Eyes:      General:         Right eye: No discharge.         Left eye: No discharge.      Extraocular Movements: Extraocular movements intact.      Conjunctiva/sclera: Conjunctivae normal.   Cardiovascular:      Rate and Rhythm: Normal rate and regular rhythm.      Heart sounds: Normal heart sounds. No murmur heard.     No friction rub. No gallop.   Pulmonary:      Effort: Pulmonary effort is normal. No respiratory distress.      Breath sounds: Normal breath sounds. " No wheezing or rales.   Abdominal:      General: Bowel sounds are normal. There is no distension.      Palpations: Abdomen is soft.      Tenderness: There is no abdominal tenderness.   Musculoskeletal:         General: Normal range of motion.      Cervical back: Normal range of motion and neck supple.   Skin:     General: Skin is warm and dry.      Findings: No erythema or rash.   Neurological:      Mental Status: He is alert and oriented to person, place, and time.

## 2024-09-23 NOTE — ASSESSMENT & PLAN NOTE
Night sweats with chills. Similar symptoms in the past.  No history of exposure to TB. Does not live in a confined space.    PLAN  - Will test to rule-out TB    Orders:    Quantiferon TB Gold Plus Assay; Future    HIV 1/2 AG/AB w Reflex [facility] for 2 yr old and above; Future

## 2024-09-23 NOTE — LETTER
September 23, 2024     Patient: Booker Melendez  YOB: 1995  Date of Visit: 9/23/2024      To Whom it May Concern:    Booker Melendez is under my professional care. Booker was seen in my office on 9/23/2024. Booker may return to work on 9/24/24 .    If you have any questions or concerns, please don't hesitate to call.         Sincerely,          [facility] Caitlyn        CC: No Recipients

## 2024-09-24 LAB
GAMMA INTERFERON BACKGROUND BLD IA-ACNC: 0.03 IU/ML
HIV 1+2 AB+HIV1 P24 AG SERPL QL IA: NORMAL
HIV 2 AB SERPL QL IA: NORMAL
HIV1 AB SERPL QL IA: NORMAL
HIV1 P24 AG SERPL QL IA: NORMAL
M TB IFN-G BLD-IMP: NEGATIVE
M TB IFN-G CD4+ BCKGRND COR BLD-ACNC: 0 IU/ML
M TB IFN-G CD4+ BCKGRND COR BLD-ACNC: 0 IU/ML
MITOGEN IGNF BCKGRD COR BLD-ACNC: 9.97 IU/ML

## 2025-04-10 ENCOUNTER — OFFICE VISIT (OUTPATIENT)
Dept: FAMILY MEDICINE CLINIC | Facility: CLINIC | Age: 30
End: 2025-04-10

## 2025-04-10 VITALS
DIASTOLIC BLOOD PRESSURE: 72 MMHG | BODY MASS INDEX: 24.68 KG/M2 | RESPIRATION RATE: 18 BRPM | OXYGEN SATURATION: 99 % | HEART RATE: 68 BPM | SYSTOLIC BLOOD PRESSURE: 110 MMHG | HEIGHT: 69 IN | WEIGHT: 166.6 LBS | TEMPERATURE: 98.1 F

## 2025-04-10 DIAGNOSIS — R68.89 FLU-LIKE SYMPTOMS: ICD-10-CM

## 2025-04-10 DIAGNOSIS — Z76.89 ENCOUNTER FOR MEDICAL CARE: Primary | ICD-10-CM

## 2025-04-10 PROCEDURE — 99213 OFFICE O/P EST LOW 20 MIN: CPT

## 2025-04-10 NOTE — PROGRESS NOTES
"Name: Booker Melendez      : 1995      MRN: [id number]  Encounter Provider: AMADA Valencia  Encounter Date: 4/10/2025   Encounter department: [facility]  :  Assessment & Plan  Encounter for medical care    Orders:    Chlamydia/GC amplified DNA by PCR; Future    Hepatitis C antibody; Future    RPR-Syphilis Screening (Total Syphilis IGG/IGM)    HIV 1/2 AG/AB w Reflex [facility] for 2 yr old and above; Future    Flu-like symptoms  - Pt report congestion and sore throat for few days, trial OTC   - Pt notes sx has improved  - Continue OTC management  - ED precaution discussed   - Pt verbalized understanding               History of Present Illness   Patient is a 29 y.o. male whom  has no past medical history on file. who is seen today in office for f/u. Concerns today includes flu like sx and STI testing. Pt spouse was recently seen in urgent care for upper respiratory illness and oral ulcer. She was started on antivirus therapy for herpes. Pt is concerned and he wants STI testing.      Review of Systems   Constitutional: Negative.    HENT:  Positive for congestion and sore throat.    Eyes: Negative.    Respiratory:  Negative for cough and chest tightness.    Cardiovascular: Negative.    Gastrointestinal: Negative.    Genitourinary: Negative.    Musculoskeletal: Negative.    Skin: Negative.    Neurological: Negative.    Hematological: Negative.    Psychiatric/Behavioral: Negative.         Objective   /72 (BP Location: Right arm, Patient Position: Sitting, Cuff Size: Standard)   Pulse 68   Temp 98.1 °F (36.7 °C) (Temporal)   Resp 18   Ht 5' 9\" (1.753 m)   Wt 75.6 kg (166 lb 9.6 oz)   SpO2 99%   BMI 24.60 kg/m²      Physical Exam  Vitals reviewed.   Constitutional:       General: He is not in acute distress.     Appearance: Normal appearance. He is normal weight. He is not ill-appearing.   HENT:      Head: Normocephalic and atraumatic.      Right Ear: Tympanic " membrane normal.      Left Ear: Tympanic membrane normal.   Cardiovascular:      Rate and Rhythm: Normal rate and regular rhythm.      Pulses: Normal pulses.      Heart sounds: Normal heart sounds.   Pulmonary:      Effort: Pulmonary effort is normal. No respiratory distress.      Breath sounds: Normal breath sounds.   Abdominal:      General: Bowel sounds are normal. There is no distension.      Palpations: Abdomen is soft.   Musculoskeletal:         General: No swelling. Normal range of motion.      Cervical back: Normal range of motion. No rigidity.   Skin:     General: Skin is warm.   Neurological:      General: No focal deficit present.      Mental Status: He is alert and oriented to person, place, and time. Mental status is at baseline.   Psychiatric:         Mood and Affect: Mood normal.         Behavior: Behavior normal.         Thought Content: Thought content normal.         Judgment: Judgment normal.

## 2025-04-10 NOTE — LETTER
April 10, 2025     Patient: Booker Melendez  YOB: 1995  Date of Visit: 4/10/2025      To Whom it May Concern:    Booker Melendez is under my professional care. Booker was seen in my office on 4/10/2025. Booker may return to work on 4/11/25 .    If you have any questions or concerns, please don't hesitate to call.         Sincerely,          AMADA Valencia        CC: No Recipients

## 2025-04-10 NOTE — ASSESSMENT & PLAN NOTE
- Pt report congestion and sore throat for few days, trial OTC   - Pt notes sx has improved  - Continue OTC management  - ED precaution discussed   - Pt verbalized understanding

## 2025-04-12 ENCOUNTER — APPOINTMENT (OUTPATIENT)
Dept: LAB | Facility: HOSPITAL | Age: 30
End: 2025-04-12
Payer: COMMERCIAL

## 2025-04-12 DIAGNOSIS — Z76.89 ENCOUNTER FOR MEDICAL CARE: ICD-10-CM

## 2025-04-12 PROCEDURE — 87591 N.GONORRHOEAE DNA AMP PROB: CPT

## 2025-04-12 PROCEDURE — 87389 HIV-1 AG W/HIV-1&-2 AB AG IA: CPT

## 2025-04-12 PROCEDURE — 36415 COLL VENOUS BLD VENIPUNCTURE: CPT

## 2025-04-12 PROCEDURE — 86780 TREPONEMA PALLIDUM: CPT

## 2025-04-12 PROCEDURE — 87491 CHLMYD TRACH DNA AMP PROBE: CPT

## 2025-04-12 PROCEDURE — 86803 HEPATITIS C AB TEST: CPT

## 2025-04-13 LAB
HCV AB SER QL: NORMAL
HIV 1+2 AB+HIV1 P24 AG SERPL QL IA: NORMAL
TREPONEMA PALLIDUM IGG+IGM AB [PRESENCE] IN SERUM OR PLASMA BY IMMUNOASSAY: NORMAL

## 2025-04-14 ENCOUNTER — RESULTS FOLLOW-UP (OUTPATIENT)
Dept: FAMILY MEDICINE CLINIC | Facility: CLINIC | Age: 30
End: 2025-04-14

## 2025-04-14 LAB
C TRACH DNA SPEC QL NAA+PROBE: NEGATIVE
N GONORRHOEA DNA SPEC QL NAA+PROBE: NEGATIVE